# Patient Record
Sex: FEMALE | Race: WHITE | NOT HISPANIC OR LATINO | Employment: UNEMPLOYED | URBAN - METROPOLITAN AREA
[De-identification: names, ages, dates, MRNs, and addresses within clinical notes are randomized per-mention and may not be internally consistent; named-entity substitution may affect disease eponyms.]

---

## 2017-01-09 NOTE — TELEPHONE ENCOUNTER
Medication Refill Request    Date of phone call: 1/9/17    Medication being requested: Hydro-apap 7.5 sig: q6hrs  Qty: 120    Date of last visit: 12/14/16    Date of last refill: 12/14/16    BENSON up to date?: 12/13/16    Next Follow up?: 1/18/17    Any new pertinent information? (i.e, new medication allergies, new use of medications, change in patient's health or condition, non-compliance or inconsistency with prescribing agreement?): Patient states her knee replacement was rescheduled due to her being sick and she states she will run out of her pain medication on 1/14/17 which is a Saturday and we are closed.

## 2017-01-10 RX ORDER — HYDROCODONE BITARTRATE AND ACETAMINOPHEN 7.5; 325 MG/1; MG/1
1 TABLET ORAL EVERY 6 HOURS PRN
Qty: 24 TABLET | Refills: 0 | Status: SHIPPED | OUTPATIENT
Start: 2017-01-10 | End: 2017-01-18 | Stop reason: SDUPTHER

## 2017-01-18 ENCOUNTER — OFFICE VISIT (OUTPATIENT)
Dept: PAIN MEDICINE | Facility: CLINIC | Age: 57
End: 2017-01-18

## 2017-01-18 VITALS
WEIGHT: 206.6 LBS | OXYGEN SATURATION: 100 % | TEMPERATURE: 97.8 F | SYSTOLIC BLOOD PRESSURE: 152 MMHG | RESPIRATION RATE: 16 BRPM | DIASTOLIC BLOOD PRESSURE: 100 MMHG | BODY MASS INDEX: 33.2 KG/M2 | HEIGHT: 66 IN | HEART RATE: 102 BPM

## 2017-01-18 DIAGNOSIS — Z79.899 ENCOUNTER FOR LONG-TERM CURRENT USE OF HIGH RISK MEDICATION: ICD-10-CM

## 2017-01-18 DIAGNOSIS — M17.0 PRIMARY OSTEOARTHRITIS OF BOTH KNEES: ICD-10-CM

## 2017-01-18 DIAGNOSIS — M25.561 CHRONIC PAIN OF BOTH KNEES: ICD-10-CM

## 2017-01-18 DIAGNOSIS — G89.29 OTHER CHRONIC PAIN: Primary | ICD-10-CM

## 2017-01-18 DIAGNOSIS — G89.29 CHRONIC PAIN OF BOTH KNEES: ICD-10-CM

## 2017-01-18 DIAGNOSIS — M25.562 CHRONIC PAIN OF BOTH KNEES: ICD-10-CM

## 2017-01-18 LAB
POC AMPHETAMINES: NEGATIVE
POC BARBITURATES: NEGATIVE
POC BENZODIAZEPHINES: NEGATIVE
POC COCAINE: NEGATIVE
POC METHADONE: NEGATIVE
POC METHAMPHETAMINE SCREEN URINE: NEGATIVE
POC OPIATES: POSITIVE
POC OXYCODONE: NEGATIVE
POC PHENCYCLIDINE: NEGATIVE
POC PROPOXYPHENE: NEGATIVE
POC THC: NEGATIVE
POC TRICYCLIC ANTIDEPRESSANTS: POSITIVE

## 2017-01-18 PROCEDURE — 99213 OFFICE O/P EST LOW 20 MIN: CPT | Performed by: NURSE PRACTITIONER

## 2017-01-18 PROCEDURE — 80305 DRUG TEST PRSMV DIR OPT OBS: CPT | Performed by: NURSE PRACTITIONER

## 2017-01-18 RX ORDER — HYDROCODONE BITARTRATE AND ACETAMINOPHEN 7.5; 325 MG/1; MG/1
1 TABLET ORAL EVERY 6 HOURS PRN
Qty: 120 TABLET | Refills: 0 | Status: SHIPPED | OUTPATIENT
Start: 2017-01-18 | End: 2017-03-21 | Stop reason: SDUPTHER

## 2017-01-18 RX ORDER — CEFDINIR 300 MG/1
CAPSULE ORAL
Refills: 0 | COMMUNITY
Start: 2017-01-09 | End: 2017-03-21

## 2017-01-18 RX ORDER — PREDNISONE 10 MG/1
TABLET ORAL
Refills: 0 | COMMUNITY
Start: 2017-01-09 | End: 2017-03-21

## 2017-01-18 NOTE — PROGRESS NOTES
CHIEF COMPLAINT    Since pt's last visit, her right knee pain has increased a little. She said last week it felt a little better while she was on prednisone for her respiratory infection. Knee surgery rescheduled due to illness.     Subjective   Amanda Bettencourt is a 56 y.o. female  who presents to the office for follow-up.She has a history of chronic bilateral knee pain.  Overall her pain pattern remains unchanged.  She needs a knee replacement which was initially scheduled last week but had to be rescheduled due to illness.  She continues to report moderate ongoing benefit with current regimen.  She is maintained on hydrocodone 7.5/325 4/day.  Denies adverse reactions, ADL's by self.      Knee Pain    Incident onset: chronic problem. The pain is present in the left knee and right knee (TKA left knee 2005 and right knee 2007, left knee cap [id number].  ). The quality of the pain is described as aching. The pain is at a severity of 7/10. The pain has been constant since onset. Pertinent negatives include no numbness. The symptoms are aggravated by movement (walking, prolonged sitting). She has tried ice (Maintained on hydrocodone 7.5/325 4/day, Gabapentin 600 mg TID) for the symptoms. The treatment provided moderate relief.      PEG Assessment   What number best describes your pain on average in the past week?7  What number best describes how, during the past week, pain has interfered with your enjoyment of life?7  What number best describes how, during the past week, pain has interfered with your general activity?  7    The following portions of the patient's history were reviewed and updated as appropriate: allergies, current medications, past family history, past medical history, past social history, past surgical history and problem list.    Review of Systems   Constitutional: Negative for activity change, appetite change, chills and fever.   HENT: Positive for congestion.    Eyes: Negative for pain.  "  Respiratory: Positive for cough. Negative for shortness of breath and wheezing.    Cardiovascular: Negative for chest pain.   Gastrointestinal: Negative for constipation, diarrhea, nausea and vomiting.   Genitourinary: Negative for difficulty urinating, dyspareunia and dysuria.   Musculoskeletal: Negative for back pain.        Knee     Neurological: Negative for dizziness, weakness, light-headedness, numbness and headaches.   Psychiatric/Behavioral: Positive for sleep disturbance. Negative for agitation, confusion, hallucinations, self-injury and suicidal ideas. The patient is nervous/anxious.        Vitals:    01/18/17 0757   BP: 152/100   Pulse: 102   Resp: 16   Temp: 97.8 °F (36.6 °C)   SpO2: 100%   Weight: 206 lb 9.6 oz (93.7 kg)   Height: 66\" (167.6 cm)   PainSc: 7  Comment: right   PainLoc: Knee         Objective   Physical Exam   Constitutional: She is oriented to person, place, and time. She appears well-developed and well-nourished. She is cooperative.   HENT:   Head: Normocephalic and atraumatic.   Nose: Nose normal.   Eyes: Conjunctivae and lids are normal.   Neck: Trachea normal.   Cardiovascular: Normal rate, regular rhythm and normal heart sounds.    Pulmonary/Chest: Effort normal and breath sounds normal. No respiratory distress. She has no wheezes.   Musculoskeletal:        Right knee: She exhibits decreased range of motion and swelling. She exhibits no effusion and no ecchymosis. Tenderness found.   Neurological: She is alert and oriented to person, place, and time. Gait normal.   Skin: Skin is warm, dry and intact.   Psychiatric: She has a normal mood and affect. Her speech is normal and behavior is normal. Judgment and thought content normal. Cognition and memory are normal.   Nursing note and vitals reviewed.      Assessment/Plan   Amanda was seen today for pain.    Diagnoses and all orders for this visit:    Other chronic pain    Chronic pain of both knees    Primary osteoarthritis of both " knees    Encounter for long-term current use of high risk medication    Other orders  -     HYDROcodone-acetaminophen (NORCO) 7.5-325 MG per tablet; Take 1 tablet by mouth Every 6 (Six) Hours As Needed (pain).      --- Refill Hydrocodone. Patient appears stable with current regimen. No adverse effects. Regarding continuation of opioids, there is no evidence of aberrant behavior or any red flags.  The patient continues with appropriate response to opioid therapy. ADL's remain intact by self.   --- Routine UDS in office today as part of monitoring requirements for controlled substances.  The specimen was viewed and the immunoassay result reviewed and is +OPI, TCA.  This specimen will be sent to [facility] laboratory for confirmation.     --- Follow-up 1 month          BENSON REPORT    As part of the patient's treatment plan, I am prescribing controlled substances. The patient has been made aware of appropriate use of such medications, including potential risk of somnolence, limited ability to drive and/or work safely, and the potential for dependence or overdose. It has also bee made clear that these medications are for use by this patient only, without concomitant use of alcohol or other substances unless prescribed.     Patient has completed prescribing agreement detailing terms of continued prescribing of controlled substances, including monitoring BENSON reports, urine drug screening, and pill counts if necessary. The patient is aware that inappropriate use will results in cessation of prescribing such medications.    BENSON report has been reviewed and scanned into the patient's chart.    Date of last BENSON : 1-    History and physical exam exhibit continued safe and appropriate use of controlled substances.    EMR Dragon/Transcription disclaimer:   Much of this encounter note is an electronic transcription/translation of spoken language to printed text. The electronic translation of spoken language may  permit erroneous, or at times, nonsensical words or phrases to be inadvertently transcribed; Although I have reviewed the note for such errors, some may still exist.

## 2017-02-17 ENCOUNTER — TELEPHONE (OUTPATIENT)
Dept: PAIN MEDICINE | Facility: CLINIC | Age: 57
End: 2017-02-17

## 2017-03-21 ENCOUNTER — OFFICE VISIT (OUTPATIENT)
Dept: PAIN MEDICINE | Facility: CLINIC | Age: 57
End: 2017-03-21

## 2017-03-21 VITALS
DIASTOLIC BLOOD PRESSURE: 99 MMHG | SYSTOLIC BLOOD PRESSURE: 147 MMHG | TEMPERATURE: 96.1 F | HEIGHT: 66 IN | HEART RATE: 93 BPM | WEIGHT: 205.4 LBS | BODY MASS INDEX: 33.01 KG/M2 | OXYGEN SATURATION: 100 % | RESPIRATION RATE: 18 BRPM

## 2017-03-21 DIAGNOSIS — Z79.899 ENCOUNTER FOR LONG-TERM CURRENT USE OF HIGH RISK MEDICATION: ICD-10-CM

## 2017-03-21 DIAGNOSIS — G89.29 OTHER CHRONIC PAIN: Primary | ICD-10-CM

## 2017-03-21 DIAGNOSIS — M17.0 PRIMARY OSTEOARTHRITIS OF BOTH KNEES: ICD-10-CM

## 2017-03-21 DIAGNOSIS — G89.18 ACUTE POST-OPERATIVE PAIN: ICD-10-CM

## 2017-03-21 PROCEDURE — 99214 OFFICE O/P EST MOD 30 MIN: CPT | Performed by: NURSE PRACTITIONER

## 2017-03-21 RX ORDER — HYDROCODONE BITARTRATE AND ACETAMINOPHEN 7.5; 325 MG/1; MG/1
1 TABLET ORAL EVERY 8 HOURS PRN
Qty: 90 TABLET | Refills: 0 | Status: SHIPPED | OUTPATIENT
Start: 2017-03-21 | End: 2017-04-07 | Stop reason: SDUPTHER

## 2017-03-21 RX ORDER — POTASSIUM CHLORIDE 1500 MG/1
TABLET, FILM COATED, EXTENDED RELEASE ORAL
Refills: 5 | COMMUNITY
Start: 2017-02-13 | End: 2019-02-26

## 2017-03-21 RX ORDER — HYDROCODONE BITARTRATE AND ACETAMINOPHEN 7.5; 325 MG/1; MG/1
1 TABLET ORAL EVERY 6 HOURS PRN
Qty: 120 TABLET | Refills: 0 | Status: SHIPPED | OUTPATIENT
Start: 2017-03-21 | End: 2017-03-21 | Stop reason: SDUPTHER

## 2017-03-21 NOTE — PROGRESS NOTES
CHIEF COMPLAINT  Follow-up for knee pain. Ms. Bettencourt states that her knee pain has gotten better since her last appt. She states that she had 2 surgeries on her right knee since her last appt.    Subjective   Amanda Bettencourt is a 56 y.o. female  who presents to the office for follow-up.She has a history of chronic knee pain.  Knee pain has improved since previous office visit.  Since last office visit she has had a right total knee replacement 1- a second surgery because of hematoma on 2-.  She will be starting second round of PT soon.  Pain has improved since surgery.    She continues to report moderate benefit with current regimen. She is maintained on hydrocodone 7.5/325, she has been able to reduce the amount needed since surgery and would like to try going back down to 3/day. Denies adverse reactions, ADL's by self.     Knee Pain    Incident onset: chronic problem. The pain is present in the left knee and right knee (TKA left knee 2005 and right knee 2007, left knee cap [id number].  ). The quality of the pain is described as aching. The pain is at a severity of 6/10. The pain has been constant since onset. Associated symptoms include numbness (right lower leg). The symptoms are aggravated by movement (walking, prolonged sitting). She has tried ice (Maintained on hydrocodone 7.5/325 4/day, Gabapentin 600 mg TID) for the symptoms. The treatment provided moderate relief.      PEG Assessment   What number best describes your pain on average in the past week?6  What number best describes how, during the past week, pain has interfered with your enjoyment of life?7  What number best describes how, during the past week, pain has interfered with your general activity?  6    The following portions of the patient's history were reviewed and updated as appropriate: allergies, current medications, past family history, past medical history, past social history, past surgical history and problem  "list.    Review of Systems   Constitutional: Positive for fatigue.   HENT: Negative for congestion.    Eyes: Negative for visual disturbance.   Respiratory: Positive for cough. Negative for shortness of breath and wheezing.    Cardiovascular: Negative.    Gastrointestinal: Negative for constipation and diarrhea.   Genitourinary: Negative for difficulty urinating.   Musculoskeletal: Positive for arthralgias (right knee).   Neurological: Positive for numbness (right lower leg). Negative for weakness.   Psychiatric/Behavioral: Positive for sleep disturbance. Negative for suicidal ideas. The patient is nervous/anxious.        Vitals:    03/21/17 1316   BP: 147/99   Pulse: 93   Resp: 18   Temp: 96.1 °F (35.6 °C)   SpO2: 100%   Weight: 205 lb 6.4 oz (93.2 kg)   Height: 66\" (167.6 cm)   PainSc:   6   PainLoc: Knee     Objective   Physical Exam   Constitutional: She is oriented to person, place, and time. She appears well-developed and well-nourished. She is cooperative.   HENT:   Head: Normocephalic and atraumatic.   Nose: Nose normal.   Eyes: Conjunctivae and lids are normal.   Neck: Trachea normal.   Cardiovascular: Normal rate, regular rhythm and normal heart sounds.    Pulmonary/Chest: Effort normal and breath sounds normal. No respiratory distress. She has no wheezes.   Musculoskeletal:        Right knee: She exhibits decreased range of motion and swelling. She exhibits no effusion and no ecchymosis. Tenderness found.   Surgical incision mostly healed right knee, some minimal scattered scabbing noted. No erythema, drainage.     Neurological: She is alert and oriented to person, place, and time. Gait normal.   Skin: Skin is warm, dry and intact.   Psychiatric: She has a normal mood and affect. Her speech is normal and behavior is normal. Judgment and thought content normal. Cognition and memory are normal.   Nursing note and vitals reviewed.    Assessment/Plan   Amanda was seen today for knee pain.    Diagnoses and all " orders for this visit:    Other chronic pain    Primary osteoarthritis of both knees    Encounter for long-term current use of high risk medication    Acute post-operative pain    Other orders  -     Discontinue: HYDROcodone-acetaminophen (NORCO) 7.5-325 MG per tablet; Take 1 tablet by mouth Every 6 (Six) Hours As Needed (pain).  -     HYDROcodone-acetaminophen (NORCO) 7.5-325 MG per tablet; Take 1 tablet by mouth Every 8 (Eight) Hours As Needed for Severe Pain (7-10).      --- Refill hydrocodone, decrease to 3/day.  Patient appears stable with current regimen. No adverse effects. Regarding continuation of opioids, there is no evidence of aberrant behavior or any red flags.  The patient continues with appropriate response to opioid therapy. ADL's remain intact by self.   --- The urine drug screen confirmation from 1- has been reviewed and the result is appropriate based on patient history and BENSON report  --- Follow-up 2 months          BENSON REPORT    As part of the patient's treatment plan, I am prescribing controlled substances. The patient has been made aware of appropriate use of such medications, including potential risk of somnolence, limited ability to drive and/or work safely, and the potential for dependence or overdose. It has also bee made clear that these medications are for use by this patient only, without concomitant use of alcohol or other substances unless prescribed.     Patient has completed prescribing agreement detailing terms of continued prescribing of controlled substances, including monitoring BENSON reports, urine drug screening, and pill counts if necessary. The patient is aware that inappropriate use will results in cessation of prescribing such medications.    BENSON report has been reviewed and scanned into the patient's chart.    Date of last BENSON : 3-    History and physical exam exhibit continued safe and appropriate use of controlled substances.

## 2017-03-24 ENCOUNTER — TELEPHONE (OUTPATIENT)
Dept: PAIN MEDICINE | Facility: CLINIC | Age: 57
End: 2017-03-24

## 2017-03-24 NOTE — TELEPHONE ENCOUNTER
Ms. Bettencourt called on 3/23/17 and left a message stating that she got her hydro/apap filled at [facility] on [address] instead of her normal pharmacy because they didn't have it.

## 2017-04-07 RX ORDER — HYDROCODONE BITARTRATE AND ACETAMINOPHEN 7.5; 325 MG/1; MG/1
1 TABLET ORAL EVERY 8 HOURS PRN
Qty: 90 TABLET | Refills: 0 | Status: SHIPPED | OUTPATIENT
Start: 2017-04-07 | End: 2017-05-17 | Stop reason: SDUPTHER

## 2017-04-07 NOTE — TELEPHONE ENCOUNTER
Medication Refill Request    Date of phone call: 4/7/17    Medication being requested: Hydrocodone-acetaminophen (Norco) 7.5-325 sig: Take 1 Tablet by mouth Every 8 (Eight) Hours As Needed for Severe pain (7-10)  Qty: 90    Date of last visit: 3/21/17    Date of last refill: 3/21/17    BENSON up to date?: yes    Next Follow up?: 5/18/17    Any new pertinent information? (i.e, new medication allergies, new use of medications, change in patient's health or condition, non-compliance or inconsistency with prescribing agreement?): n/a

## 2017-05-17 ENCOUNTER — OFFICE VISIT (OUTPATIENT)
Dept: PAIN MEDICINE | Facility: CLINIC | Age: 57
End: 2017-05-17

## 2017-05-17 VITALS
OXYGEN SATURATION: 98 % | RESPIRATION RATE: 18 BRPM | HEIGHT: 66 IN | TEMPERATURE: 98.1 F | DIASTOLIC BLOOD PRESSURE: 95 MMHG | HEART RATE: 89 BPM | WEIGHT: 214 LBS | BODY MASS INDEX: 34.39 KG/M2 | SYSTOLIC BLOOD PRESSURE: 152 MMHG

## 2017-05-17 DIAGNOSIS — Z79.899 ENCOUNTER FOR LONG-TERM CURRENT USE OF HIGH RISK MEDICATION: ICD-10-CM

## 2017-05-17 DIAGNOSIS — M17.0 PRIMARY OSTEOARTHRITIS OF BOTH KNEES: ICD-10-CM

## 2017-05-17 DIAGNOSIS — G89.29 OTHER CHRONIC PAIN: Primary | ICD-10-CM

## 2017-05-17 PROCEDURE — 99213 OFFICE O/P EST LOW 20 MIN: CPT | Performed by: NURSE PRACTITIONER

## 2017-05-17 RX ORDER — HYDROCODONE BITARTRATE AND ACETAMINOPHEN 7.5; 325 MG/1; MG/1
1 TABLET ORAL EVERY 8 HOURS PRN
Qty: 90 TABLET | Refills: 0 | Status: SHIPPED | OUTPATIENT
Start: 2017-05-17 | End: 2017-06-09 | Stop reason: SDUPTHER

## 2017-06-09 NOTE — TELEPHONE ENCOUNTER
Medication Refill Request    Date of phone call: 6/8/17    Medication being requested: Hydro-apap 7.5 sig: q8hrs  Qty: 90    Date of last visit: 5/17/17    Date of last refill: 5/17/17    BENSON up to date?: 5/16/17    Next Follow up?: 7/14/17    Any new pertinent information? (i.e, new medication allergies, new use of medications, change in patient's health or condition, non-compliance or inconsistency with prescribing agreement?): n/a

## 2017-06-13 RX ORDER — HYDROCODONE BITARTRATE AND ACETAMINOPHEN 7.5; 325 MG/1; MG/1
1 TABLET ORAL EVERY 8 HOURS PRN
Qty: 90 TABLET | Refills: 0 | Status: SHIPPED | OUTPATIENT
Start: 2017-06-13 | End: 2017-07-18 | Stop reason: SDUPTHER

## 2017-06-14 ENCOUNTER — TELEPHONE (OUTPATIENT)
Dept: PAIN MEDICINE | Facility: CLINIC | Age: 57
End: 2017-06-14

## 2017-06-14 NOTE — TELEPHONE ENCOUNTER
I spoke with Ms. Bettencourt today and she wanted to let you know that she is having surgery on her ankle on 6/30/17 with Dr. Swanson. She also wanted to let you know that she is going to get her post-op pain med from her surgeon.

## 2017-07-18 ENCOUNTER — OFFICE VISIT (OUTPATIENT)
Dept: PAIN MEDICINE | Facility: CLINIC | Age: 57
End: 2017-07-18

## 2017-07-18 VITALS
BODY MASS INDEX: 34.39 KG/M2 | WEIGHT: 214 LBS | RESPIRATION RATE: 16 BRPM | DIASTOLIC BLOOD PRESSURE: 84 MMHG | HEIGHT: 66 IN | HEART RATE: 99 BPM | SYSTOLIC BLOOD PRESSURE: 124 MMHG | TEMPERATURE: 98.6 F | OXYGEN SATURATION: 98 %

## 2017-07-18 DIAGNOSIS — M25.561 CHRONIC PAIN OF BOTH KNEES: ICD-10-CM

## 2017-07-18 DIAGNOSIS — Z79.899 ENCOUNTER FOR LONG-TERM CURRENT USE OF HIGH RISK MEDICATION: ICD-10-CM

## 2017-07-18 DIAGNOSIS — G89.29 CHRONIC PAIN OF BOTH KNEES: ICD-10-CM

## 2017-07-18 DIAGNOSIS — M25.562 CHRONIC PAIN OF BOTH KNEES: ICD-10-CM

## 2017-07-18 DIAGNOSIS — G89.29 OTHER CHRONIC PAIN: Primary | ICD-10-CM

## 2017-07-18 DIAGNOSIS — M17.0 PRIMARY OSTEOARTHRITIS OF BOTH KNEES: ICD-10-CM

## 2017-07-18 LAB
POC AMPHETAMINES: NEGATIVE
POC BARBITURATES: NEGATIVE
POC BENZODIAZEPHINES: POSITIVE
POC COCAINE: NEGATIVE
POC METHADONE: NEGATIVE
POC METHAMPHETAMINE SCREEN URINE: NEGATIVE
POC OPIATES: POSITIVE
POC OXYCODONE: NEGATIVE
POC PHENCYCLIDINE: NEGATIVE
POC PROPOXYPHENE: NEGATIVE
POC THC: NEGATIVE
POC TRICYCLIC ANTIDEPRESSANTS: NEGATIVE

## 2017-07-18 PROCEDURE — 99213 OFFICE O/P EST LOW 20 MIN: CPT | Performed by: NURSE PRACTITIONER

## 2017-07-18 PROCEDURE — 80305 DRUG TEST PRSMV DIR OPT OBS: CPT | Performed by: NURSE PRACTITIONER

## 2017-07-18 RX ORDER — HYDROCODONE BITARTRATE AND ACETAMINOPHEN 7.5; 325 MG/1; MG/1
1 TABLET ORAL EVERY 8 HOURS PRN
Qty: 90 TABLET | Refills: 0 | Status: SHIPPED | OUTPATIENT
Start: 2017-07-18 | End: 2017-08-08 | Stop reason: SDUPTHER

## 2017-07-18 NOTE — PROGRESS NOTES
CHIEF COMPLAINT    Since last visit, pt states her right knee pain has decreased a little. Her left ankle pain has increased since she had surgery. She will have her stitches out on the 27th.    Subjective   Amanda Bettencourt is a 57 y.o. female  who presents to the office for follow-up.She has a history of chronic bilateral knee pain, overall knee pain stable, she is improved since right TKA approximately 5 months ago.  She underwent a left ankle surgery 6/30/2017, fractured the ankle a few months back, still reporting post operative discomfort following this procedure, she is in a cast today and is non-weightbearing.      She continues with hydrocodone 7.5/325, has been back to taking every 8 hours over the past week and is doing well with this regimen.  Denies adverse reactions, ADL's by self.      Knee Pain    Incident onset: chronic problem. The pain is present in the left knee and right knee (TKA left knee 2005 and right knee 2007, left knee cap [id number], right TKA 2017). The quality of the pain is described as aching. The pain is at a severity of 4/10. The pain has been constant since onset. Pertinent negatives include no numbness. The symptoms are aggravated by movement (walking, prolonged sitting). She has tried ice (Maintained on hydrocodone 7.5/325 4/day, Gabapentin 600 mg TID) for the symptoms. The treatment provided moderate relief.      PEG Assessment   What number best describes your pain on average in the past week?6  What number best describes how, during the past week, pain has interfered with your enjoyment of life?7  What number best describes how, during the past week, pain has interfered with your general activity?  7    The following portions of the patient's history were reviewed and updated as appropriate: allergies, current medications, past family history, past medical history, past social history, past surgical history and problem list.    Review of Systems   Constitutional:  "Negative for chills and fever.   Respiratory: Negative for shortness of breath.    Cardiovascular: Negative for chest pain.   Gastrointestinal: Negative for constipation, diarrhea, nausea and vomiting.   Genitourinary: Negative for difficulty urinating, dyspareunia and dysuria.   Musculoskeletal:        Right knee   Neurological: Negative for dizziness, weakness, light-headedness, numbness and headaches.   Psychiatric/Behavioral: Positive for sleep disturbance. Negative for confusion, hallucinations, self-injury and suicidal ideas. The patient is nervous/anxious.      Vitals:    07/18/17 1124   BP: 124/84   Pulse: 99   Resp: 16   Temp: 98.6 °F (37 °C)   SpO2: 98%   Weight: 214 lb (97.1 kg)  Comment: pt refused weigh in. wearing cast   Height: 66\" (167.6 cm)   PainSc:   4   PainLoc: Knee  Comment: left ankle 5-6       Objective   Physical Exam   Constitutional: She is oriented to person, place, and time. She appears well-developed and well-nourished. She is cooperative.   HENT:   Head: Normocephalic and atraumatic.   Nose: Nose normal.   Eyes: Conjunctivae and lids are normal.   Neck: Trachea normal.   Cardiovascular: Normal rate.    Pulmonary/Chest: Effort normal. No respiratory distress.   Musculoskeletal:        Right knee: She exhibits decreased range of motion and swelling. She exhibits no effusion and no ecchymosis. Tenderness found.        Left ankle: She exhibits decreased range of motion.   Surgical wound bilateral knee  Left foot/ankle cast    Neurological: She is alert and oriented to person, place, and time. Gait (knee scooter) abnormal.   Skin: Skin is warm, dry and intact.   Psychiatric: She has a normal mood and affect. Her speech is normal and behavior is normal. Judgment and thought content normal. Cognition and memory are normal.   Nursing note and vitals reviewed.      Assessment/Plan   Amanda was seen today for pain.    Diagnoses and all orders for this visit:    Other chronic pain  -     Urine " Drug Screen Confirmation  -     POC Urine Drug Screen, Triage    Primary osteoarthritis of both knees  -     Urine Drug Screen Confirmation  -     POC Urine Drug Screen, Triage    Chronic pain of both knees  -     Urine Drug Screen Confirmation  -     POC Urine Drug Screen, Triage    Encounter for long-term current use of high risk medication  -     Urine Drug Screen Confirmation  -     POC Urine Drug Screen, Triage    Other orders  -     HYDROcodone-acetaminophen (NORCO) 7.5-325 MG per tablet; Take 1 tablet by mouth Every 8 (Eight) Hours As Needed for Severe Pain .      --- Refill hydrocodone. Patient appears stable with current regimen. No adverse effects. Regarding continuation of opioids, there is no evidence of aberrant behavior or any red flags.  The patient continues with appropriate response to opioid therapy. ADL's remain intact by self.   --- Routine UDS in office today as part of monitoring requirements for controlled substances.  The specimen was viewed and the immunoassay result reviewed and is +OPI, BZD (assume BZD false positive).  This specimen will be sent to [facility] laboratory for confirmation.     --- Follow-up 2 months          BENSON REPORT  As part of the patient's treatment plan, I am prescribing controlled substances. The patient has been made aware of appropriate use of such medications, including potential risk of somnolence, limited ability to drive and/or work safely, and the potential for dependence or overdose. It has also bee made clear that these medications are for use by this patient only, without concomitant use of alcohol or other substances unless prescribed.     Patient has completed prescribing agreement detailing terms of continued prescribing of controlled substances, including monitoring BENSON reports, urine drug screening, and pill counts if necessary. The patient is aware that inappropriate use will results in cessation of prescribing such medications.    BENSON report  has been reviewed and scanned into the patient's chart.    Date of last BENSON : 7/17/2017    History and physical exam exhibit continued safe and appropriate use of controlled substances.    EMR Dragon/Transcription disclaimer:   Much of this encounter note is an electronic transcription/translation of spoken language to printed text. The electronic translation of spoken language may permit erroneous, or at times, nonsensical words or phrases to be inadvertently transcribed; Although I have reviewed the note for such errors, some may still exist.

## 2017-08-08 RX ORDER — HYDROCODONE BITARTRATE AND ACETAMINOPHEN 7.5; 325 MG/1; MG/1
1 TABLET ORAL EVERY 8 HOURS PRN
Qty: 90 TABLET | Refills: 0 | Status: SHIPPED | OUTPATIENT
Start: 2017-08-08 | End: 2017-09-13 | Stop reason: SDUPTHER

## 2017-08-08 NOTE — TELEPHONE ENCOUNTER
Medication Refill Request    Date of phone call: 8/8/17    Medication being requested: Hydrocodone-apap 7.5 sig: q8hrs  Qty: 90    Date of last visit: 7/18/17    Date of last refill: 7/18/17    BENSON up to date?: 7/17/17    Next Follow up?: 9/19/17    Any new pertinent information? (i.e, new medication allergies, new use of medications, change in patient's health or condition, non-compliance or inconsistency with prescribing agreement?): n/a

## 2017-09-06 ENCOUNTER — CLINICAL SUPPORT (OUTPATIENT)
Dept: OTHER | Facility: HOSPITAL | Age: 57
End: 2017-09-06

## 2017-09-06 ENCOUNTER — HOSPITAL ENCOUNTER (OUTPATIENT)
Dept: PET IMAGING | Facility: HOSPITAL | Age: 57
Discharge: HOME | End: 2017-09-06
Admitting: CLINICAL NURSE SPECIALIST

## 2017-09-06 DIAGNOSIS — F17.210 SMOKING GREATER THAN 30 PACK YEARS: ICD-10-CM

## 2017-09-06 DIAGNOSIS — Z12.2 ENCOUNTER FOR SCREENING FOR LUNG CANCER: ICD-10-CM

## 2017-09-06 DIAGNOSIS — Z12.2 ENCOUNTER FOR SCREENING FOR LUNG CANCER: Primary | ICD-10-CM

## 2017-09-06 PROBLEM — E11.9 DIABETES MELLITUS (HCC): Status: ACTIVE | Noted: 2017-09-06

## 2017-09-06 PROBLEM — I10 HYPERTENSION: Status: ACTIVE | Noted: 2017-09-06

## 2017-09-06 PROBLEM — E78.5 DYSLIPIDEMIA: Status: ACTIVE | Noted: 2017-09-06

## 2017-09-06 PROCEDURE — G0297 LDCT FOR LUNG CA SCREEN: HCPCS

## 2017-09-06 PROCEDURE — G0296 VISIT TO DETERM LDCT ELIG: HCPCS | Performed by: CLINICAL NURSE SPECIALIST

## 2017-09-13 ENCOUNTER — OFFICE VISIT (OUTPATIENT)
Dept: PAIN MEDICINE | Facility: CLINIC | Age: 57
End: 2017-09-13

## 2017-09-13 VITALS
TEMPERATURE: 99 F | HEART RATE: 100 BPM | SYSTOLIC BLOOD PRESSURE: 165 MMHG | HEIGHT: 66 IN | OXYGEN SATURATION: 99 % | DIASTOLIC BLOOD PRESSURE: 96 MMHG | RESPIRATION RATE: 18 BRPM

## 2017-09-13 DIAGNOSIS — M17.0 PRIMARY OSTEOARTHRITIS OF BOTH KNEES: ICD-10-CM

## 2017-09-13 DIAGNOSIS — Z79.899 ENCOUNTER FOR LONG-TERM CURRENT USE OF HIGH RISK MEDICATION: ICD-10-CM

## 2017-09-13 DIAGNOSIS — G89.29 OTHER CHRONIC PAIN: Primary | ICD-10-CM

## 2017-09-13 PROCEDURE — 99214 OFFICE O/P EST MOD 30 MIN: CPT | Performed by: NURSE PRACTITIONER

## 2017-09-13 RX ORDER — GABAPENTIN 800 MG/1
800 TABLET ORAL 3 TIMES DAILY
Qty: 90 TABLET | Refills: 1 | Status: SHIPPED | OUTPATIENT
Start: 2017-09-13 | End: 2017-11-10 | Stop reason: SDUPTHER

## 2017-09-13 RX ORDER — HYDROCODONE BITARTRATE AND ACETAMINOPHEN 7.5; 325 MG/1; MG/1
1 TABLET ORAL EVERY 8 HOURS PRN
Qty: 90 TABLET | Refills: 0 | Status: SHIPPED | OUTPATIENT
Start: 2017-09-13 | End: 2017-09-13 | Stop reason: SDUPTHER

## 2017-09-13 RX ORDER — HYDROCODONE BITARTRATE AND ACETAMINOPHEN 7.5; 325 MG/1; MG/1
1 TABLET ORAL EVERY 8 HOURS PRN
Qty: 90 TABLET | Refills: 0 | Status: SHIPPED | OUTPATIENT
Start: 2017-09-13 | End: 2017-10-05 | Stop reason: SDUPTHER

## 2017-09-13 NOTE — PROGRESS NOTES
"CHIEF COMPLAINT  Follow-up for knee pain. Ms. Bettencourt states that her knee pain is unchanged from her last appt.    Subjective   Amanda Bettencourt is a 57 y.o. female  who presents to the office for follow-up.She has a history of chronic knee pain and unchanged since last office visit.    Is in a walking boot on her LLE. Is having increased \"nerve pain.\"    Complains of pain in her knees. Today her pain is 6/10VAS. Pain in continuous and unchanged. Continues with Hydrocodone 7.5/325 3/day and gabapentin 600 mg 3/day.  Denies any side effects from the regimen. The regimen helps decrease her pain by 60-70%. ADL's by self.    Knee Pain    Incident onset: chronic problem. The pain is present in the left knee and right knee (TKA left knee 2005 and right knee 2007, left knee cap [id number], right TKA 2017). The quality of the pain is described as aching. The pain is at a severity of 6/10. The pain has been constant since onset. Associated symptoms include numbness (left foot). The symptoms are aggravated by movement (walking, prolonged sitting). She has tried ice (Maintained on hydrocodone 7.5/325 3/day, Gabapentin 600 mg TID) for the symptoms. The treatment provided moderate relief.      PEG Assessment   What number best describes your pain on average in the past week?6  What number best describes how, during the past week, pain has interfered with your enjoyment of life?5  What number best describes how, during the past week, pain has interfered with your general activity?  6    The following portions of the patient's history were reviewed and updated as appropriate: allergies, current medications, past family history, past medical history, past social history, past surgical history and problem list.    Review of Systems   Constitutional: Negative for fatigue.   HENT: Negative for congestion.    Eyes: Negative for visual disturbance.   Respiratory: Positive for cough. Negative for shortness of breath and " "wheezing.    Cardiovascular: Negative.    Gastrointestinal: Negative for constipation and diarrhea.   Genitourinary: Negative for difficulty urinating.   Musculoskeletal: Positive for arthralgias (right knee and left ankle).   Neurological: Positive for numbness (left foot). Negative for weakness.   Psychiatric/Behavioral: Positive for sleep disturbance. Negative for suicidal ideas. The patient is nervous/anxious.        Vitals:    09/13/17 0939   BP: 165/96   Pulse: 100   Resp: 18   Temp: 99 °F (37.2 °C)   SpO2: 99%   Weight: Comment: pt refused to give weight. wearing a walking boot   Height: 66\" (167.6 cm)   PainSc:   6   PainLoc: Knee     Objective   Physical Exam   Constitutional: She is oriented to person, place, and time. She appears well-developed and well-nourished. She is cooperative.   HENT:   Head: Normocephalic and atraumatic.   Nose: Nose normal.   Eyes: Conjunctivae and lids are normal.   Neck: Trachea normal.   Cardiovascular: Normal rate.    Pulmonary/Chest: Effort normal. No respiratory distress.   Musculoskeletal:        Right knee: She exhibits decreased range of motion and swelling. She exhibits no effusion and no ecchymosis. Tenderness found.        Left ankle: She exhibits decreased range of motion.   Left foot/ankle walking boot   Neurological: She is alert and oriented to person, place, and time. Gait (altered by LLE boot) abnormal.   Skin: Skin is warm, dry and intact.   Psychiatric: She has a normal mood and affect. Her speech is normal and behavior is normal. Judgment and thought content normal. Cognition and memory are normal.   Nursing note and vitals reviewed.      Assessment/Plan   Amanda was seen today for knee pain.    Diagnoses and all orders for this visit:    Other chronic pain    Primary osteoarthritis of both knees    Encounter for long-term current use of high risk medication    Other orders  -     Discontinue: HYDROcodone-acetaminophen (NORCO) 7.5-325 MG per tablet; Take 1 " tablet by mouth Every 8 (Eight) Hours As Needed for Severe Pain .  -     HYDROcodone-acetaminophen (NORCO) 7.5-325 MG per tablet; Take 1 tablet by mouth Every 8 (Eight) Hours As Needed for Severe Pain .  -     gabapentin (NEURONTIN) 800 MG tablet; Take 1 tablet by mouth 3 (Three) Times a Day.      --- The urine drug screen confirmation from 7-18-17 has been reviewed and the result is appropriate based on patient history and BENSON report  --- Refill Hydrocodone. Patient appears stable with current regimen. No adverse effects. Regarding continuation of opioids, there is no evidence of aberrant behavior or any red flags.  The patient continues with appropriate response to opioid therapy. ADL's remain intact by self.   --- Increase gabapentin to 800 mg TID. Discussed medication with the patient.  Included in this discussion was the potential for side effects and adverse events.  Patient verbalized understanding and wished to proceed.  Prescription will be sent to pharmacy.  --- Follow-up 2 months or sooner if needed.       BENSON REPORT    As part of the patient's treatment plan, I am prescribing controlled substances. The patient has been made aware of appropriate use of such medications, including potential risk of somnolence, limited ability to drive and/or work safely, and the potential for dependence or overdose. It has also bee made clear that these medications are for use by this patient only, without concomitant use of alcohol or other substances unless prescribed.     Patient has completed prescribing agreement detailing terms of continued prescribing of controlled substances, including monitoring BENSON reports, urine drug screening, and pill counts if necessary. The patient is aware that inappropriate use will results in cessation of prescribing such medications.    BENSON report has been reviewed and scanned into the patient's chart.    Date of last BENSON : 9-11-17    History and physical exam exhibit  continued safe and appropriate use of controlled substances.      EMR Dragon/Transcription disclaimer:   Much of this encounter note is an electronic transcription/translation of spoken language to printed text. The electronic translation of spoken language may permit erroneous, or at times, nonsensical words or phrases to be inadvertently transcribed; Although I have reviewed the note for such errors, some may still exist.

## 2017-10-05 NOTE — TELEPHONE ENCOUNTER
Medication Refill Request    Date of phone call: 10/5/17    Medication being requested: Hydrocodone-apap 7.5 sig: q8hrs  Qty: 90    Date of last visit: 9/13/17    Date of last refill: 9/13/17    BENSON up to date?: 9/12/17    Next Follow up?: 11/13/17    Any new pertinent information? (i.e, new medication allergies, new use of medications, change in patient's health or condition, non-compliance or inconsistency with prescribing agreement?): n/a

## 2017-10-09 RX ORDER — HYDROCODONE BITARTRATE AND ACETAMINOPHEN 7.5; 325 MG/1; MG/1
1 TABLET ORAL EVERY 8 HOURS PRN
Qty: 90 TABLET | Refills: 0 | Status: SHIPPED | OUTPATIENT
Start: 2017-10-09 | End: 2017-11-10 | Stop reason: SDUPTHER

## 2017-11-10 ENCOUNTER — OFFICE VISIT (OUTPATIENT)
Dept: PAIN MEDICINE | Facility: CLINIC | Age: 57
End: 2017-11-10

## 2017-11-10 VITALS
OXYGEN SATURATION: 98 % | HEART RATE: 96 BPM | BODY MASS INDEX: 34.72 KG/M2 | RESPIRATION RATE: 16 BRPM | WEIGHT: 216 LBS | SYSTOLIC BLOOD PRESSURE: 132 MMHG | DIASTOLIC BLOOD PRESSURE: 86 MMHG | TEMPERATURE: 96.6 F | HEIGHT: 66 IN

## 2017-11-10 DIAGNOSIS — M17.0 PRIMARY OSTEOARTHRITIS OF BOTH KNEES: ICD-10-CM

## 2017-11-10 DIAGNOSIS — G89.29 OTHER CHRONIC PAIN: Primary | ICD-10-CM

## 2017-11-10 DIAGNOSIS — Z79.899 ENCOUNTER FOR LONG-TERM CURRENT USE OF HIGH RISK MEDICATION: ICD-10-CM

## 2017-11-10 DIAGNOSIS — M25.569 KNEE PAIN, UNSPECIFIED CHRONICITY, UNSPECIFIED LATERALITY: ICD-10-CM

## 2017-11-10 PROCEDURE — 99213 OFFICE O/P EST LOW 20 MIN: CPT | Performed by: NURSE PRACTITIONER

## 2017-11-10 RX ORDER — GABAPENTIN 800 MG/1
800 TABLET ORAL 3 TIMES DAILY
Qty: 90 TABLET | Refills: 1 | Status: SHIPPED | OUTPATIENT
Start: 2017-11-10 | End: 2017-12-18 | Stop reason: SDUPTHER

## 2017-11-10 RX ORDER — HYDROCODONE BITARTRATE AND ACETAMINOPHEN 7.5; 325 MG/1; MG/1
1 TABLET ORAL EVERY 8 HOURS PRN
Qty: 90 TABLET | Refills: 0 | Status: SHIPPED | OUTPATIENT
Start: 2017-11-10 | End: 2017-12-01 | Stop reason: SDUPTHER

## 2017-11-10 NOTE — PROGRESS NOTES
"CHIEF COMPLAINT    F/U knee and left ankle pain, last visit 9-13-17. Pt states the increase in Gabapentin has helped reduce the tingling and sharpness in her left ankle.     Subjective   Amanda Bettencourt is a 57 y.o. female  who presents to the office for follow-up.She has a history of chronic knee pain/OA. At her last office visit, gabapentin was increased to 800 mg TID due to \"nerve pain\" in LLE. Is noticing improvement in this. No longer has to wear boot of left foot.     Complains of pain in her knees. Today her pain is 5/10VAs. Describes the pain as continuous and mildly improved since last office visit. Continues with Hydrocodone 7.5/325 3/day and gabapentin 800 mg 3/day.  Denies any side effects from the regimen. The regimen helps decrease her pain by 60-70%. ADL's by self.    Sees Dr. Mahoney for her knees.  Sees Dr. kumar for her left ankle.     Knee Pain    Incident onset: chronic problem. The pain is present in the left knee and right knee (TKA left knee 2005 and right knee 2007, left knee cap [id number], right TKA 2017). The quality of the pain is described as aching. The pain is at a severity of 5/10. The pain has been constant (mildly improved since last office visit) since onset. Associated symptoms include numbness (left foot). The symptoms are aggravated by movement (walking, prolonged sitting). She has tried ice (Maintained on hydrocodone 7.5/325 3/day, Gabapentin 600 mg TID) for the symptoms. The treatment provided moderate relief.      PEG Assessment   What number best describes your pain on average in the past week?6  What number best describes how, during the past week, pain has interfered with your enjoyment of life?5  What number best describes how, during the past week, pain has interfered with your general activity?  6    The following portions of the patient's history were reviewed and updated as appropriate: allergies, current medications, past family history, past medical " "history, past social history, past surgical history and problem list.    Review of Systems   Constitutional: Negative for chills, fatigue and fever.   HENT: Negative for congestion.    Eyes: Negative for visual disturbance.   Respiratory: Positive for cough (pt states she had a lung screen done and it was clear). Negative for shortness of breath and wheezing.    Cardiovascular: Negative.  Negative for chest pain.   Gastrointestinal: Negative for constipation and diarrhea.   Genitourinary: Negative for difficulty urinating.   Musculoskeletal: Positive for arthralgias (right knee and left ankle).   Neurological: Positive for numbness (left foot) and headaches (sinus related , mainly in AM). Negative for dizziness, weakness and light-headedness.   Psychiatric/Behavioral: Positive for sleep disturbance. Negative for agitation, confusion, hallucinations and suicidal ideas. The patient is nervous/anxious.        Vitals:    11/10/17 0846   BP: 132/86   Pulse: 96   Resp: 16   Temp: 96.6 °F (35.9 °C)   SpO2: 98%   Weight: 216 lb (98 kg)   Height: 66\" (167.6 cm)   PainSc:   5   PainLoc: Knee     Objective   Physical Exam   Constitutional: She is oriented to person, place, and time. She appears well-developed and well-nourished. She is cooperative.   HENT:   Head: Normocephalic and atraumatic.   Nose: Nose normal.   Eyes: Conjunctivae and lids are normal.   Neck: Trachea normal.   Cardiovascular: Normal rate.    Pulmonary/Chest: Effort normal. No respiratory distress.   Musculoskeletal:        Right knee: She exhibits decreased range of motion and swelling. She exhibits no effusion and no ecchymosis. Tenderness found.        Left ankle: She exhibits decreased range of motion.   Neurological: She is alert and oriented to person, place, and time. Gait (altered by LLE ) abnormal.   Skin: Skin is warm, dry and intact.   Psychiatric: She has a normal mood and affect. Her speech is normal and behavior is normal. Judgment and thought " content normal. Cognition and memory are normal.   Nursing note and vitals reviewed.      Assessment/Plan   Amanda was seen today for pain.    Diagnoses and all orders for this visit:    Other chronic pain    Primary osteoarthritis of both knees    Knee pain, unspecified chronicity, unspecified laterality    Encounter for long-term current use of high risk medication    Other orders  -     HYDROcodone-acetaminophen (NORCO) 7.5-325 MG per tablet; Take 1 tablet by mouth Every 8 (Eight) Hours As Needed for Severe Pain .  -     gabapentin (NEURONTIN) 800 MG tablet; Take 1 tablet by mouth 3 (Three) Times a Day.      --- The urine drug screen confirmation from 7-18-17 has been reviewed and the result is appropriate based on patient history and BENSON report  --- refill Hydrocodone and gabapentin. Patient appears stable with current regimen. No adverse effects. Regarding continuation of opioids, there is no evidence of aberrant behavior or any red flags.  The patient continues with appropriate response to opioid therapy. ADL's remain intact by self.   --- Follow-up with Dr. kumar as scheduled.  --- Follow-up 2 months or sooner if needed.       BENSON REPORT    As part of the patient's treatment plan, I am prescribing controlled substances. The patient has been made aware of appropriate use of such medications, including potential risk of somnolence, limited ability to drive and/or work safely, and the potential for dependence or overdose. It has also bee made clear that these medications are for use by this patient only, without concomitant use of alcohol or other substances unless prescribed.     Patient has completed prescribing agreement detailing terms of continued prescribing of controlled substances, including monitoring BENSON reports, urine drug screening, and pill counts if necessary. The patient is aware that inappropriate use will results in cessation of prescribing such medications.    BENSON report has  been reviewed and scanned into the patient's chart.    Date of last BENSON : 11-7-17    History and physical exam exhibit continued safe and appropriate use of controlled substances.      EMR Dragon/Transcription disclaimer:   Much of this encounter note is an electronic transcription/translation of spoken language to printed text. The electronic translation of spoken language may permit erroneous, or at times, nonsensical words or phrases to be inadvertently transcribed; Although I have reviewed the note for such errors, some may still exist.

## 2017-12-04 RX ORDER — HYDROCODONE BITARTRATE AND ACETAMINOPHEN 7.5; 325 MG/1; MG/1
1 TABLET ORAL EVERY 8 HOURS PRN
Qty: 90 TABLET | Refills: 0 | Status: SHIPPED | OUTPATIENT
Start: 2017-12-04 | End: 2018-01-09 | Stop reason: SDUPTHER

## 2017-12-08 ENCOUNTER — TELEPHONE (OUTPATIENT)
Dept: PAIN MEDICINE | Facility: CLINIC | Age: 57
End: 2017-12-08

## 2017-12-08 NOTE — TELEPHONE ENCOUNTER
MsLisette Bettencourt called today to inform our office that she took her hydro/apap to [facility] in [place] to get a refill since her regular pharmacy didn't have the medication.

## 2017-12-18 ENCOUNTER — TELEPHONE (OUTPATIENT)
Dept: PAIN MEDICINE | Facility: CLINIC | Age: 57
End: 2017-12-18

## 2017-12-18 RX ORDER — GABAPENTIN 800 MG/1
800 TABLET ORAL 3 TIMES DAILY
Qty: 270 TABLET | Refills: 1 | Status: SHIPPED | OUTPATIENT
Start: 2017-12-18

## 2017-12-18 NOTE — TELEPHONE ENCOUNTER
Patient wanted to know if we could send a 90 day supply of Gabapentin to [facility]?    If so I will need a script printed

## 2018-01-09 ENCOUNTER — OFFICE VISIT (OUTPATIENT)
Dept: PAIN MEDICINE | Facility: CLINIC | Age: 58
End: 2018-01-09

## 2018-01-09 VITALS
HEART RATE: 108 BPM | HEIGHT: 66 IN | SYSTOLIC BLOOD PRESSURE: 149 MMHG | BODY MASS INDEX: 34.87 KG/M2 | TEMPERATURE: 98.4 F | OXYGEN SATURATION: 98 % | DIASTOLIC BLOOD PRESSURE: 96 MMHG | RESPIRATION RATE: 18 BRPM | WEIGHT: 217 LBS

## 2018-01-09 DIAGNOSIS — Z79.899 ENCOUNTER FOR LONG-TERM CURRENT USE OF HIGH RISK MEDICATION: ICD-10-CM

## 2018-01-09 DIAGNOSIS — M17.0 PRIMARY OSTEOARTHRITIS OF BOTH KNEES: ICD-10-CM

## 2018-01-09 DIAGNOSIS — G89.29 OTHER CHRONIC PAIN: Primary | ICD-10-CM

## 2018-01-09 DIAGNOSIS — M25.569 KNEE PAIN, UNSPECIFIED CHRONICITY, UNSPECIFIED LATERALITY: ICD-10-CM

## 2018-01-09 PROCEDURE — 99213 OFFICE O/P EST LOW 20 MIN: CPT | Performed by: NURSE PRACTITIONER

## 2018-01-09 RX ORDER — HYDROCODONE BITARTRATE AND ACETAMINOPHEN 7.5; 325 MG/1; MG/1
1 TABLET ORAL EVERY 8 HOURS PRN
Qty: 90 TABLET | Refills: 0 | Status: SHIPPED | OUTPATIENT
Start: 2018-01-09 | End: 2018-01-31 | Stop reason: SDUPTHER

## 2018-01-09 NOTE — PROGRESS NOTES
CHIEF COMPLAINT  Bilateral knee pain and Left Ankle pain are unchanged since last visit.    Subjective   Amanda Bettencourt is a 57 y.o. female  who presents to the office for follow-up.She has a history of chronic knee pain and left ankle pain. Reports her pain pattern is unchanged since last office visit.    She reports that she fell on right knee in early December. Has not seen Dr. Mahoney since then. No improvement with Voltaren gel.     Complains of pain in her knees. Today her pain is 6/10VAS. Describes the pain as nearly continuous and unchanged.  Continues with Hydrocodone 7.5/325 3/day and gabapentin 800 mg 3/day.  Denies any side effects from the regimen. The regimen helps decrease her pain by 50%. Notes improvement in function and activity with regimen. ADL's by self.     Sees Dr. Mahoney for her knees.  Sees Dr. kumar for her left ankle which is improved, but still having swelling.    Knee Pain    Incident onset: chronic problem. The pain is present in the left knee and right knee (TKA left knee 2005 and right knee 2007, left knee cap [id number], right TKA 2017). The quality of the pain is described as aching. The pain is at a severity of 6/10. The pain has been constant (unchanged since last office visit) since onset. Pertinent negatives include no numbness. The symptoms are aggravated by movement (walking, prolonged sitting). She has tried ice (Maintained on hydrocodone 7.5/325 3/day, Gabapentin 600 mg TID) for the symptoms. The treatment provided moderate relief.      PEG Assessment   What number best describes your pain on average in the past week?6  What number best describes how, during the past week, pain has interfered with your enjoyment of life?6  What number best describes how, during the past week, pain has interfered with your general activity?  6    The following portions of the patient's history were reviewed and updated as appropriate: allergies, current medications, past family  "history, past medical history, past social history, past surgical history and problem list.    Review of Systems   Constitutional: Negative for chills and fever.   Respiratory: Negative for shortness of breath.    Cardiovascular: Negative for chest pain.   Gastrointestinal: Negative for constipation, diarrhea, nausea and vomiting.   Genitourinary: Negative for difficulty urinating and dyspareunia.   Musculoskeletal:        Knee and ankle   Neurological: Negative for dizziness, weakness, light-headedness, numbness and headaches.   Psychiatric/Behavioral: Positive for sleep disturbance. Negative for confusion, hallucinations, self-injury and suicidal ideas. The patient is nervous/anxious.        Vitals:    01/09/18 0834   BP: 149/96   Pulse: 108   Resp: 18   Temp: 98.4 °F (36.9 °C)   SpO2: 98%   Weight: 98.4 kg (217 lb)   Height: 167.6 cm (66\")   PainSc:   6   PainLoc: Knee     Objective   Physical Exam   Constitutional: She is oriented to person, place, and time. She appears well-developed and well-nourished. She is cooperative.   HENT:   Head: Normocephalic and atraumatic.   Nose: Nose normal.   Eyes: Conjunctivae and lids are normal.   Neck: Trachea normal.   Cardiovascular: Normal rate.    Pulmonary/Chest: Effort normal. No respiratory distress.   Musculoskeletal:        Right knee: She exhibits decreased range of motion and swelling. She exhibits no effusion and no ecchymosis. Tenderness found.        Left ankle: She exhibits decreased range of motion.   Surgical scar of bilateral knees.   Neurological: She is alert and oriented to person, place, and time. Gait (altered by LLE ) abnormal.   Skin: Skin is warm, dry and intact.   Psychiatric: She has a normal mood and affect. Her speech is normal and behavior is normal. Judgment and thought content normal. Cognition and memory are normal.   Nursing note and vitals reviewed.    Assessment/Plan   Amanda was seen today for knee pain and ankle injury.    Diagnoses and " all orders for this visit:    Other chronic pain    Primary osteoarthritis of both knees    Knee pain, unspecified chronicity, unspecified laterality    Encounter for long-term current use of high risk medication    Other orders  -     HYDROcodone-acetaminophen (NORCO) 7.5-325 MG per tablet; Take 1 tablet by mouth Every 8 (Eight) Hours As Needed for Severe Pain .      --- The urine drug screen confirmation from 7-18-17 has been reviewed and the result is appropriate based on patient history and BENSON report  --- Refill Hydrocodone. Patient appears stable with current regimen. No adverse effects. Regarding continuation of opioids, there is no evidence of aberrant behavior or any red flags.  The patient continues with appropriate response to opioid therapy. ADL's remain intact by self.  --- Suggest follow-up with Dr. Mahoney.   --- Follow-up 2 months or sooner if needed.       BENSON REPORT    As part of the patient's treatment plan, I am prescribing controlled substances. The patient has been made aware of appropriate use of such medications, including potential risk of somnolence, limited ability to drive and/or work safely, and the potential for dependence or overdose. It has also bee made clear that these medications are for use by this patient only, without concomitant use of alcohol or other substances unless prescribed.     Patient has completed prescribing agreement detailing terms of continued prescribing of controlled substances, including monitoring BENSON reports, urine drug screening, and pill counts if necessary. The patient is aware that inappropriate use will results in cessation of prescribing such medications.    BENSON report has been reviewed and scanned into the patient's chart.    Date of last BENSON : 1-8-18    History and physical exam exhibit continued safe and appropriate use of controlled substances.      EMR Dragon/Transcription disclaimer:   Much of this encounter note is an electronic  transcription/translation of spoken language to printed text. The electronic translation of spoken language may permit erroneous, or at times, nonsensical words or phrases to be inadvertently transcribed; Although I have reviewed the note for such errors, some may still exist.

## 2018-01-31 RX ORDER — HYDROCODONE BITARTRATE AND ACETAMINOPHEN 7.5; 325 MG/1; MG/1
1 TABLET ORAL EVERY 8 HOURS PRN
Qty: 90 TABLET | Refills: 0 | Status: SHIPPED | OUTPATIENT
Start: 2018-01-31 | End: 2018-03-07 | Stop reason: SDUPTHER

## 2018-02-20 ENCOUNTER — OFFICE VISIT (OUTPATIENT)
Dept: GASTROENTEROLOGY | Facility: CLINIC | Age: 58
End: 2018-02-20

## 2018-02-20 VITALS
HEIGHT: 66 IN | BODY MASS INDEX: 35.58 KG/M2 | TEMPERATURE: 98.5 F | WEIGHT: 221.4 LBS | SYSTOLIC BLOOD PRESSURE: 136 MMHG | DIASTOLIC BLOOD PRESSURE: 86 MMHG

## 2018-02-20 DIAGNOSIS — K31.84 GASTROPARESIS: Primary | ICD-10-CM

## 2018-02-20 DIAGNOSIS — K21.9 GASTROESOPHAGEAL REFLUX DISEASE, ESOPHAGITIS PRESENCE NOT SPECIFIED: ICD-10-CM

## 2018-02-20 PROCEDURE — 99213 OFFICE O/P EST LOW 20 MIN: CPT | Performed by: NURSE PRACTITIONER

## 2018-02-20 RX ORDER — PANTOPRAZOLE SODIUM 40 MG/1
TABLET, DELAYED RELEASE ORAL
Qty: 90 TABLET | Refills: 3 | Status: SHIPPED | OUTPATIENT
Start: 2018-02-20 | End: 2019-02-18 | Stop reason: SDUPTHER

## 2018-02-20 RX ORDER — METOCLOPRAMIDE 10 MG/1
10 TABLET ORAL 3 TIMES DAILY
Qty: 270 TABLET | Refills: 3 | Status: SHIPPED | OUTPATIENT
Start: 2018-02-20 | End: 2019-05-02 | Stop reason: SDUPTHER

## 2018-02-20 RX ORDER — METOCLOPRAMIDE 10 MG/1
10 TABLET ORAL 3 TIMES DAILY
Qty: 90 TABLET | Refills: 0 | Status: SHIPPED | OUTPATIENT
Start: 2018-02-20 | End: 2018-02-20 | Stop reason: SDUPTHER

## 2018-02-20 NOTE — PROGRESS NOTES
Chief Complaint   Patient presents with   • Heartburn   • Diarrhea   • Abdominal Pain       Amanda Bettencourt is a  57 y.o. female here for a follow up visit for GERD and gastroparesis secondary to DM. She is a patient of Dr. Figueredo. She reports doing well on the Protonix 40 mg daily for GERD. She denies any breakthrough reflux symptoms. She denies any abd pain, dysphagia, diarrhea or constipation. She denies any rectal bleeding. She admits she takes Reglan 10 mg 2-3 times a day most days. She has taken it for a long time and admits it really helps her. She is able to eat meals without N&V or abd pain. She denies any tardive dyskinesia symptoms. She does have hx of depression and anxiety but admits Prozac helps her with those symptoms. She does report loose bowels but denies any fecal urgency or incontinence. She is beginning to wonder if dairy is making her stools more loose and causing some stomach upset. She had a milkshake for dinner the other night and immediately after eating it she had abd discomfort, bloating, gas and some diarrhea. She is thinking about cutting out dairy from her diet. She has been looking into dairy free alternatives. Last Colonoscopy was normal except for some non-bleeding internal hemorrhoids in 2015 with Dr. Figueredo.  She has FH of colon cancer (maternal grandmother).    HPI    Past Medical History:   Diagnosis Date   • Allergic rhinitis    • Anemia    • Anxiety    • Back pain    • CTS (carpal tunnel syndrome)    • Depression    • Diabetes mellitus    • Esophageal reflux    • Esophagitis, reflux    • Fatigue    • Fracture of left ankle 12/24/2015   • Hypercholesterolemia    • Hyperlipidemia    • Hypertension    • Hypokalemia    • Hyponatremia    • IBS (irritable bowel syndrome)    • Joint pain, knee     both knees   • Osteoarthritis, knee    • Osteoarthritis, shoulder    • Plantar fasciitis    • Sleep apnea        Past Surgical History:   Procedure Laterality Date   • ANKLE SURGERY Left  2017   •  SECTION     • COLONOSCOPY  2015    Non-bleeding internal hemorrhoids   • HYSTEROSCOPY ENDOMETRIAL ABLATION     • KNEE ARTHROSCOPY Bilateral    • KNEE SURGERY Right 02/10/2017   • REPLACEMENT TOTAL KNEE Right 2017   • TONSILLECTOMY     • TOTAL KNEE ARTHROPLASTY Bilateral    • TUBAL ABDOMINAL LIGATION         Scheduled Meds:    Continuous Infusions:  No current facility-administered medications for this visit.     PRN Meds:.    No Known Allergies    Social History     Social History   • Marital status:      Spouse name: N/A   • Number of children: N/A   • Years of education: N/A     Occupational History   • Not on file.     Social History Main Topics   • Smoking status: Current Every Day Smoker     Packs/day: 2.00     Types: Cigarettes     Start date:    • Smokeless tobacco: Never Used   • Alcohol use Yes      Comment: social   • Drug use: No   • Sexual activity: Not on file     Other Topics Concern   • Not on file     Social History Narrative       Family History   Problem Relation Age of Onset   • Arthritis Other    • Cancer Other    • Heart disease Other    • Hyperlipidemia Other    • Stroke Other    • Colon cancer Maternal Grandmother        Review of Systems   Constitutional: Negative for appetite change, chills, diaphoresis, fatigue, fever and unexpected weight change.   HENT: Negative for nosebleeds, postnasal drip, sore throat, trouble swallowing and voice change.    Respiratory: Negative for cough, choking, chest tightness, shortness of breath and wheezing.    Cardiovascular: Negative for chest pain.   Gastrointestinal: Negative for abdominal distention, abdominal pain, blood in stool, constipation, diarrhea, nausea and vomiting.   Endocrine: Negative for polydipsia, polyphagia and polyuria.   Musculoskeletal: Negative for gait problem.   Skin: Negative for rash and wound.   Allergic/Immunologic: Negative for food allergies.   Neurological: Negative for  dizziness, speech difficulty and light-headedness.   Psychiatric/Behavioral: Negative for confusion, self-injury, sleep disturbance and suicidal ideas.       Vitals:    02/20/18 0921   BP: 136/86   Temp: 98.5 °F (36.9 °C)       Physical Exam   Constitutional: She is oriented to person, place, and time. She appears well-developed and well-nourished. She does not appear ill. No distress.   HENT:   Head: Normocephalic.   Eyes: Pupils are equal, round, and reactive to light.   Cardiovascular: Normal rate, regular rhythm and normal heart sounds.    Pulmonary/Chest: Effort normal and breath sounds normal.   Abdominal: Soft. Bowel sounds are normal. She exhibits no distension and no mass. There is no hepatosplenomegaly. There is no tenderness. There is no rebound and no guarding. No hernia.   Musculoskeletal: Normal range of motion.   Neurological: She is alert and oriented to person, place, and time.   Skin: Skin is warm and dry.   Psychiatric: She has a normal mood and affect. Her speech is normal and behavior is normal. Judgment normal.       No images are attached to the encounter.    Assessment & Plan     1. Gastroparesis    - metoclopramide (REGLAN) 10 MG tablet; Take 1 tablet by mouth 3 (Three) Times a Day.  Dispense: 270 tablet; Refill: 3    2. Gastroesophageal reflux disease, esophagitis presence not specified    - pantoprazole (PROTONIX) 40 MG EC tablet; Take 1 tablet every morning before breakfast  Dispense: 90 tablet; Refill: 3    Gastroparesis seems to be doing well on Reglan. Patient does not want to change meds at this time. She reports taking Reglan for a long time and its always helped her. I do not see any signs of tardive dyskinesia and I did discuss with her the risks with taking Reglan long term. She agrees to continue to it for now. GERD seems well controlled on the Protonix. I advised her to avoid dairy if she can to help with some of her recent symptoms. Next surveillance colonoscopy should be in  2020. Follow up with me or Dr. Figueredo in 1 year or sooner as needed.

## 2018-02-20 NOTE — PATIENT INSTRUCTIONS
Continue Reglan 10 mg up to 3 times a day as directed  Continue protonix 40 mg daily   Call with any issues  Next Colonoscopy due 2020  Follow up with me or Dr. Figueredo in 1 year

## 2018-03-07 ENCOUNTER — OFFICE VISIT (OUTPATIENT)
Dept: PAIN MEDICINE | Facility: CLINIC | Age: 58
End: 2018-03-07

## 2018-03-07 VITALS
DIASTOLIC BLOOD PRESSURE: 80 MMHG | RESPIRATION RATE: 16 BRPM | SYSTOLIC BLOOD PRESSURE: 122 MMHG | HEART RATE: 99 BPM | HEIGHT: 66 IN | BODY MASS INDEX: 35.68 KG/M2 | WEIGHT: 222 LBS | TEMPERATURE: 98.4 F | OXYGEN SATURATION: 98 %

## 2018-03-07 DIAGNOSIS — G89.29 OTHER CHRONIC PAIN: Primary | ICD-10-CM

## 2018-03-07 DIAGNOSIS — G89.29 CHRONIC PAIN OF BOTH KNEES: ICD-10-CM

## 2018-03-07 DIAGNOSIS — Z79.899 ENCOUNTER FOR LONG-TERM CURRENT USE OF HIGH RISK MEDICATION: ICD-10-CM

## 2018-03-07 DIAGNOSIS — M25.562 CHRONIC PAIN OF BOTH KNEES: ICD-10-CM

## 2018-03-07 DIAGNOSIS — M25.561 CHRONIC PAIN OF BOTH KNEES: ICD-10-CM

## 2018-03-07 DIAGNOSIS — M17.0 PRIMARY OSTEOARTHRITIS OF BOTH KNEES: ICD-10-CM

## 2018-03-07 PROCEDURE — 80305 DRUG TEST PRSMV DIR OPT OBS: CPT | Performed by: NURSE PRACTITIONER

## 2018-03-07 PROCEDURE — 99213 OFFICE O/P EST LOW 20 MIN: CPT | Performed by: NURSE PRACTITIONER

## 2018-03-07 RX ORDER — HYDROCODONE BITARTRATE AND ACETAMINOPHEN 7.5; 325 MG/1; MG/1
1 TABLET ORAL EVERY 8 HOURS PRN
Qty: 90 TABLET | Refills: 0 | Status: SHIPPED | OUTPATIENT
Start: 2018-03-07 | End: 2018-03-29 | Stop reason: SDUPTHER

## 2018-03-07 NOTE — PROGRESS NOTES
CHIEF COMPLAINT  Pt states bilateral knee pain has moderately reduced since 1/9/18 office visit.    Subjective   Amanda Bettencourt is a 57 y.o. female  who presents to the office for follow-up.She has a history of chronic bilateral knee pain. Pain overall unchanged, stable.      Complains of pain in her knees bilaterally. Today her pain is 5/10VAS. Continues with Hydrocodone 7.5/325 3/day and gabapentin 800 mg 3/day.  Denies any side effects from the regimen. The regimen helps decrease her pain by 50%. Notes improvement in function and activity with regimen. ADL's by self.    Knee Pain    Incident onset: chronic problem. The pain is present in the left knee and right knee (TKA left knee 2005 and right knee 2007, left knee cap [id number], right TKA 2017). The quality of the pain is described as aching. The pain is at a severity of 5/10. The pain has been constant (unchanged since last office visit) since onset. Pertinent negatives include no numbness. The symptoms are aggravated by movement (walking, prolonged sitting). She has tried ice (Maintained on hydrocodone 7.5/325 3/day, Gabapentin 600 mg TID) for the symptoms. The treatment provided moderate relief.      PEG Assessment   What number best describes your pain on average in the past week?5  What number best describes how, during the past week, pain has interfered with your enjoyment of life?5  What number best describes how, during the past week, pain has interfered with your general activity?  6    The following portions of the patient's history were reviewed and updated as appropriate: allergies, current medications, past family history, past medical history, past social history, past surgical history and problem list.    Review of Systems   Constitutional: Negative for activity change, appetite change, chills and fever.   Respiratory: Positive for apnea. Negative for shortness of breath.    Cardiovascular: Negative for chest pain.   Gastrointestinal:  "Negative for constipation, diarrhea, nausea and vomiting.   Genitourinary: Negative for difficulty urinating and dyspareunia.   Musculoskeletal: Positive for arthralgias.        Knee and ankle   Neurological: Negative for dizziness, weakness, light-headedness, numbness and headaches.   Psychiatric/Behavioral: Positive for sleep disturbance. Negative for confusion, hallucinations, self-injury and suicidal ideas. The patient is nervous/anxious.      Vitals:    03/07/18 0934   BP: 122/80   Pulse: 99   Resp: 16   Temp: 98.4 °F (36.9 °C)   SpO2: 98%   Weight: 101 kg (222 lb)   Height: 167.6 cm (65.98\")   PainSc: 5  Comment: bilat. knee pain ranges from 3-7/10   PainLoc: Knee     Objective   Physical Exam   Constitutional: She is oriented to person, place, and time. She appears well-developed and well-nourished. She is cooperative.   HENT:   Head: Normocephalic and atraumatic.   Nose: Nose normal.   Eyes: Conjunctivae and lids are normal.   Neck: Trachea normal.   Cardiovascular: Normal rate.    Pulmonary/Chest: Effort normal. No respiratory distress.   Musculoskeletal:        Right knee: She exhibits decreased range of motion and swelling. She exhibits no effusion and no ecchymosis. Tenderness found.        Left knee: She exhibits decreased range of motion and swelling. She exhibits no effusion and no ecchymosis. Tenderness found.        Left ankle: She exhibits decreased range of motion.   Neurological: She is alert and oriented to person, place, and time. Gait (altered by LLE ) abnormal.   Skin: Skin is warm, dry and intact.   Psychiatric: She has a normal mood and affect. Her speech is normal and behavior is normal. Judgment and thought content normal. Cognition and memory are normal.   Nursing note and vitals reviewed.    Assessment/Plan   Amanda was seen today for knee pain.    Diagnoses and all orders for this visit:    Other chronic pain    Primary osteoarthritis of both knees    Chronic pain of both " knees    Encounter for long-term current use of high risk medication    Other orders  -     HYDROcodone-acetaminophen (NORCO) 7.5-325 MG per tablet; Take 1 tablet by mouth Every 8 (Eight) Hours As Needed for Severe Pain .      --- Refill Hydrocodone. Patient appears stable with current regimen. No adverse effects. Regarding continuation of opioids, there is no evidence of aberrant behavior or any red flags.  The patient continues with appropriate response to opioid therapy. ADL's remain intact by self.   --- Routine UDS in office today as part of monitoring requirements for controlled substances.  The specimen was viewed and the immunoassay result reviewed and is +OPI, TCA.  This specimen will be sent to [email or web address] laboratory for confirmation.     --- Follow-up 2 months          BENSON REPORT    As part of the patient's treatment plan, I am prescribing controlled substances. The patient has been made aware of appropriate use of such medications, including potential risk of somnolence, limited ability to drive and/or work safely, and the potential for dependence or overdose. It has also bee made clear that these medications are for use by this patient only, without concomitant use of alcohol or other substances unless prescribed.     Patient has completed prescribing agreement detailing terms of continued prescribing of controlled substances, including monitoring BENSON reports, urine drug screening, and pill counts if necessary. The patient is aware that inappropriate use will results in cessation of prescribing such medications.    BENSON report has been reviewed and scanned into the patient's chart.    Date of last BENSON : 3/6/2018    History and physical exam exhibit continued safe and appropriate use of controlled substances.    EMR Dragon/Transcription disclaimer:   Much of this encounter note is an electronic transcription/translation of spoken language to printed text. The electronic translation of spoken  language may permit erroneous, or at times, nonsensical words or phrases to be inadvertently transcribed; Although I have reviewed the note for such errors, some may still exist.

## 2018-03-12 ENCOUNTER — RESULTS ENCOUNTER (OUTPATIENT)
Dept: PAIN MEDICINE | Facility: CLINIC | Age: 58
End: 2018-03-12

## 2018-03-12 DIAGNOSIS — M17.0 PRIMARY OSTEOARTHRITIS OF BOTH KNEES: ICD-10-CM

## 2018-03-12 DIAGNOSIS — Z79.899 ENCOUNTER FOR LONG-TERM CURRENT USE OF HIGH RISK MEDICATION: ICD-10-CM

## 2018-03-12 DIAGNOSIS — M25.561 CHRONIC PAIN OF BOTH KNEES: ICD-10-CM

## 2018-03-12 DIAGNOSIS — G89.29 CHRONIC PAIN OF BOTH KNEES: ICD-10-CM

## 2018-03-12 DIAGNOSIS — M25.562 CHRONIC PAIN OF BOTH KNEES: ICD-10-CM

## 2018-03-12 DIAGNOSIS — G89.29 OTHER CHRONIC PAIN: ICD-10-CM

## 2018-03-19 VITALS
DIASTOLIC BLOOD PRESSURE: 94 MMHG | RESPIRATION RATE: 20 BRPM | HEIGHT: 66 IN | WEIGHT: 222.5 LBS | HEART RATE: 84 BPM | SYSTOLIC BLOOD PRESSURE: 168 MMHG | OXYGEN SATURATION: 100 % | TEMPERATURE: 97 F | BODY MASS INDEX: 35.76 KG/M2

## 2018-03-29 RX ORDER — HYDROCODONE BITARTRATE AND ACETAMINOPHEN 7.5; 325 MG/1; MG/1
1 TABLET ORAL EVERY 8 HOURS PRN
Qty: 90 TABLET | Refills: 0 | Status: SHIPPED | OUTPATIENT
Start: 2018-03-29 | End: 2018-05-02 | Stop reason: SDUPTHER

## 2018-03-29 NOTE — TELEPHONE ENCOUNTER
Medication Refill Request    Date of phone call: 3-28-18    Medication being requested: Hydrocodone-apap 7.5-325 mg sig: Take 1 tablet by mouth every 8 hours as needed for severe pain  Qty: 90 tablets    Date of last visit: 3-7-18    Date of last refill: 3-7-18    BENSON up to date?: 3-6-18    Next Follow up?: 5-4-18    Any new pertinent information? (i.e, new medication allergies, new use of medications, change in patient's health or condition, non-compliance or inconsistency with prescribing agreement?): n/a

## 2018-05-02 ENCOUNTER — OFFICE VISIT (OUTPATIENT)
Dept: PAIN MEDICINE | Facility: CLINIC | Age: 58
End: 2018-05-02

## 2018-05-02 VITALS
BODY MASS INDEX: 35.68 KG/M2 | DIASTOLIC BLOOD PRESSURE: 94 MMHG | HEIGHT: 66 IN | WEIGHT: 222 LBS | RESPIRATION RATE: 18 BRPM | TEMPERATURE: 97.3 F | SYSTOLIC BLOOD PRESSURE: 147 MMHG | HEART RATE: 97 BPM | OXYGEN SATURATION: 99 %

## 2018-05-02 DIAGNOSIS — Z79.899 ENCOUNTER FOR LONG-TERM CURRENT USE OF HIGH RISK MEDICATION: ICD-10-CM

## 2018-05-02 DIAGNOSIS — M25.562 CHRONIC PAIN OF BOTH KNEES: ICD-10-CM

## 2018-05-02 DIAGNOSIS — G89.29 OTHER CHRONIC PAIN: Primary | ICD-10-CM

## 2018-05-02 DIAGNOSIS — M17.0 PRIMARY OSTEOARTHRITIS OF BOTH KNEES: ICD-10-CM

## 2018-05-02 DIAGNOSIS — G89.29 CHRONIC PAIN OF BOTH KNEES: ICD-10-CM

## 2018-05-02 DIAGNOSIS — M25.561 CHRONIC PAIN OF BOTH KNEES: ICD-10-CM

## 2018-05-02 PROCEDURE — 99213 OFFICE O/P EST LOW 20 MIN: CPT | Performed by: NURSE PRACTITIONER

## 2018-05-02 RX ORDER — HYDROCODONE BITARTRATE AND ACETAMINOPHEN 7.5; 325 MG/1; MG/1
1 TABLET ORAL EVERY 8 HOURS PRN
Qty: 90 TABLET | Refills: 0 | Status: SHIPPED | OUTPATIENT
Start: 2018-05-02 | End: 2018-05-25 | Stop reason: SDUPTHER

## 2018-05-02 NOTE — PROGRESS NOTES
CHIEF COMPLAINT  Bilateral knee pain is unchanged since last office visit.    Subjective   Amanda Bettencourt is a 57 y.o. female  who presents to the office for follow-up.She has a history of knee pain.    Complains of pain in her knees bilaterally. Today her pain is 5/10VAS. Continues with Hydrocodone 7.5/325 3/day and gabapentin 800 mg 3/day.  Denies any side effects from the regimen. The regimen helps decrease her pain by 50%. Notes improvement in function and activity with regimen. ADL's by self.    Interested in weaning medication at next visit.      Knee Pain    Incident onset: chronic problem. The pain is present in the left knee and right knee (TKA left knee 2005 and right knee 2007, left knee cap [id number], right TKA 2017). The quality of the pain is described as aching. The pain is at a severity of 5/10. The pain has been constant (unchanged since last office visit) since onset. Pertinent negatives include no numbness. The symptoms are aggravated by movement (walking, prolonged sitting). She has tried ice (Maintained on hydrocodone 7.5/325 3/day, Gabapentin 600 mg TID) for the symptoms. The treatment provided moderate relief.      PEG Assessment   What number best describes your pain on average in the past week?6  What number best describes how, during the past week, pain has interfered with your enjoyment of life?5  What number best describes how, during the past week, pain has interfered with your general activity?  5    The following portions of the patient's history were reviewed and updated as appropriate: allergies, current medications, past family history, past medical history, past social history, past surgical history and problem list.    Review of Systems   Constitutional: Negative for chills and fever.   Respiratory: Negative for shortness of breath.    Cardiovascular: Negative for chest pain.   Gastrointestinal: Positive for diarrhea. Negative for constipation, nausea and vomiting.  "  Genitourinary: Negative for difficulty urinating, dyspareunia and dysuria.   Musculoskeletal:        Knee pain   Neurological: Positive for headaches. Negative for dizziness, weakness, light-headedness and numbness.   Psychiatric/Behavioral: Positive for sleep disturbance. Negative for confusion, hallucinations, self-injury and suicidal ideas. The patient is not nervous/anxious.      Vitals:    05/02/18 0856   BP: 147/94   Pulse: 97   Resp: 18   Temp: 97.3 °F (36.3 °C)   SpO2: 99%   Weight: 101 kg (222 lb)   Height: 167.6 cm (66\")   PainSc:   5   PainLoc: Knee       Objective   Physical Exam   Constitutional: She is oriented to person, place, and time. She appears well-developed and well-nourished. She is cooperative.   HENT:   Head: Normocephalic and atraumatic.   Nose: Nose normal.   Eyes: Conjunctivae and lids are normal.   Neck: Trachea normal.   Cardiovascular: Normal rate.    Pulmonary/Chest: Effort normal. No respiratory distress.   Musculoskeletal:        Right knee: She exhibits decreased range of motion and swelling. She exhibits no effusion and no ecchymosis. Tenderness found.        Left knee: She exhibits decreased range of motion and swelling. She exhibits no effusion and no ecchymosis. Tenderness found.   Neurological: She is alert and oriented to person, place, and time. Gait (altered by LLE ) abnormal.   Skin: Skin is warm, dry and intact.   Psychiatric: She has a normal mood and affect. Her speech is normal and behavior is normal. Judgment and thought content normal. Cognition and memory are normal.   Nursing note and vitals reviewed.    Assessment/Plan   Amanda was seen today for knee pain.    Diagnoses and all orders for this visit:    Other chronic pain    Primary osteoarthritis of both knees    Chronic pain of both knees    Encounter for long-term current use of high risk medication    Other orders  -     HYDROcodone-acetaminophen (NORCO) 7.5-325 MG per tablet; Take 1 tablet by mouth Every 8 " (Eight) Hours As Needed for Severe Pain .      --- Refill hydrocodone. Patient appears stable with current regimen. No adverse effects. Regarding continuation of opioids, there is no evidence of aberrant behavior or any red flags.  The patient continues with appropriate response to opioid therapy. ADL's remain intact by self.   --- The urine drug screen confirmation from 3/7/18 has been reviewed and the result is appropriate based on patient history and BENSON report  --- Follow-up 2 months          BENSON REPORT  As part of the patient's treatment plan, I am prescribing controlled substances. The patient has been made aware of appropriate use of such medications, including potential risk of somnolence, limited ability to drive and/or work safely, and the potential for dependence or overdose. It has also bee made clear that these medications are for use by this patient only, without concomitant use of alcohol or other substances unless prescribed.     Patient has completed prescribing agreement detailing terms of continued prescribing of controlled substances, including monitoring BENSON reports, urine drug screening, and pill counts if necessary. The patient is aware that inappropriate use will results in cessation of prescribing such medications.    BENSON report has been reviewed and scanned into the patient's chart.    Date of last BENSON : 5/1/2018    History and physical exam exhibit continued safe and appropriate use of controlled substances.    EMR Dragon/Transcription disclaimer:   Much of this encounter note is an electronic transcription/translation of spoken language to printed text. The electronic translation of spoken language may permit erroneous, or at times, nonsensical words or phrases to be inadvertently transcribed; Although I have reviewed the note for such errors, some may still exist.

## 2018-05-25 RX ORDER — HYDROCODONE BITARTRATE AND ACETAMINOPHEN 7.5; 325 MG/1; MG/1
1 TABLET ORAL EVERY 8 HOURS PRN
Qty: 90 TABLET | Refills: 0 | Status: SHIPPED | OUTPATIENT
Start: 2018-05-25 | End: 2019-02-26

## 2018-05-25 NOTE — TELEPHONE ENCOUNTER
Medication Refill Request    Date of phone call: 5-24-18    Medication being requested: Hydrocodone-apap 7.5-325 mg sig: Take 1 tablet by mouth every 8 hours as needed  Qty: 90 tablets    Date of last visit: 5-02-18    Date of last refill: 5-02-18    BENSON up to date?: 5-01-18    Next Follow up?: 6-28-18    Any new pertinent information? (i.e, new medication allergies, new use of medications, change in patient's health or condition, non-compliance or inconsistency with prescribing agreement?): n/a

## 2018-05-30 ENCOUNTER — TELEPHONE (OUTPATIENT)
Dept: PAIN MEDICINE | Facility: CLINIC | Age: 58
End: 2018-05-30

## 2018-05-30 NOTE — TELEPHONE ENCOUNTER
FYI - Patient called and LM on refill line and was upset because she could not get her Hydrocodone filled until 6/3/18 and she requested that we call the pharmacy to give the OK to go ahead and fill. I explained to her that we cannot do that because she isn't due for the medication until 6/3/18.    She then asked if we can send it to [facility] on [facility], she states they could go ahead and fill it and that she has switched all of her medication there.    There is an updated Loyd in the chart.

## 2018-06-14 ENCOUNTER — TELEPHONE (OUTPATIENT)
Dept: PAIN MEDICINE | Facility: CLINIC | Age: 58
End: 2018-06-14

## 2018-06-22 ENCOUNTER — TELEPHONE (OUTPATIENT)
Dept: GASTROENTEROLOGY | Facility: CLINIC | Age: 58
End: 2018-06-22

## 2018-06-22 NOTE — TELEPHONE ENCOUNTER
----- Message from Mariaa Casiano sent at 6/22/2018 12:24 PM EDT -----  Regarding: PT CALLED - DIARRHEA  Contact: [phone]  PT PREVIOUSLY HAD C DIFF. NOW SHE IS BEGINNING TO HAVE DIARRHEA AGAIN IN THE AM & NAUSEA. PT HAS SOME QUESTIONS. WOULD LIKE TO SPEAK TO SOMEONE.

## 2018-06-22 NOTE — TELEPHONE ENCOUNTER
Called pt back. Pt states she was diagnosed with c-diff back in May right after [place]. She had a really bad sinus infection and had to go on an antibiotic for that. Then she developed c-diff because of that and was treated by her PMD for that. Pt states she has continued to have loose stools even after completing her antibiotic for the infection. She states she has 3-4 loose stools in the AM and she has nausea off and on. She states she has IBS anyway, so she is not sure if it is her IBS that is flared up or what. She states she put a call into her PMD office a little while ago to ask about getting something for nausea and she is waiting for a call back. Advised that both Dr Figueredo and the NP are out of the office today. Advised she should speak to her PMD about her continued symptoms and see what she recommends for her. Advised she can go ahead and start a probiotic if she is not already on ne. Pt verb understanding.

## 2018-11-15 ENCOUNTER — TELEPHONE (OUTPATIENT)
Dept: GASTROENTEROLOGY | Facility: CLINIC | Age: 58
End: 2018-11-15

## 2018-11-15 NOTE — TELEPHONE ENCOUNTER
----- Message from Mariaa Casiano sent at 11/15/2018  9:29 AM EST -----  Regarding: REFILL ON REGLAN    Contact: [phone]  PT STATED [facility] HAD TRY TO CONTACT US. I DON'T SEE ANYTHING. SHE DOESN'T KNOW WHY THEY WON'T REFILL. THERE ALL REFILLS ON HER SCRIPT. SHE'S ALMOST OUT OF MEDS.

## 2018-11-15 NOTE — TELEPHONE ENCOUNTER
Returned patient's phone call. She states [facility] would not refill her Reglan. Advised will call [facility]. Phone call made to [phone] spoke with Ammy Hsieh, who states the recommendation is for the patient to be on Reglan for 12 weeks. Advised patient's diagnosis is gastroparesis and is will need to take Reglan daily. She verb understanding and will mail the medication today.  Patient called, no answer, update patient her medication will be mailed today.

## 2018-11-21 ENCOUNTER — TRANSCRIBE ORDERS (OUTPATIENT)
Dept: ADMINISTRATIVE | Facility: HOSPITAL | Age: 58
End: 2018-11-21

## 2018-11-21 DIAGNOSIS — F17.200 TOBACCO DEPENDENCE: Primary | ICD-10-CM

## 2018-11-21 DIAGNOSIS — J45.909 ASTHMA, ACUTE: ICD-10-CM

## 2018-11-21 DIAGNOSIS — J42 CHRONIC BRONCHITIS, UNSPECIFIED CHRONIC BRONCHITIS TYPE (HCC): ICD-10-CM

## 2018-12-05 ENCOUNTER — TELEPHONE (OUTPATIENT)
Dept: GASTROENTEROLOGY | Facility: CLINIC | Age: 58
End: 2018-12-05

## 2018-12-05 NOTE — TELEPHONE ENCOUNTER
----- Message from Frances Bains sent at 12/5/2018 11:27 AM EST -----  Regarding: pantoprazole  Contact: [phone]  Pt wants know if its okay to take 2 dose.. Pt is complain of stomach pain

## 2018-12-05 NOTE — TELEPHONE ENCOUNTER
"Pt returned my call per a staff message.    Called pt back. Pt states yesterday her stomach started \"burning\" and it was very uncomfortable. Today she feels much better but she was wondering if she should increase the pantoprazole to twice daily? Advised would not increase the med yet since she is feeling better. Advised if she needs additional coverage can add Pepcid or Zantac OTC at nighttime as needed. Advised that if she needs to add the med frequently call back and make an appt to be seen. Pt verb understanding.   "

## 2018-12-06 ENCOUNTER — APPOINTMENT (OUTPATIENT)
Dept: RESPIRATORY THERAPY | Facility: HOSPITAL | Age: 58
End: 2018-12-06

## 2018-12-10 ENCOUNTER — OFFICE VISIT (OUTPATIENT)
Dept: GASTROENTEROLOGY | Facility: CLINIC | Age: 58
End: 2018-12-10

## 2018-12-10 VITALS
WEIGHT: 222.2 LBS | BODY MASS INDEX: 35.71 KG/M2 | TEMPERATURE: 98.8 F | HEIGHT: 66 IN | DIASTOLIC BLOOD PRESSURE: 74 MMHG | SYSTOLIC BLOOD PRESSURE: 130 MMHG

## 2018-12-10 DIAGNOSIS — Z86.19 HISTORY OF CLOSTRIDIUM DIFFICILE INFECTION: ICD-10-CM

## 2018-12-10 DIAGNOSIS — K21.9 GASTROESOPHAGEAL REFLUX DISEASE, ESOPHAGITIS PRESENCE NOT SPECIFIED: Primary | ICD-10-CM

## 2018-12-10 DIAGNOSIS — R19.7 DIARRHEA, UNSPECIFIED TYPE: ICD-10-CM

## 2018-12-10 DIAGNOSIS — K31.84 GASTROPARESIS: ICD-10-CM

## 2018-12-10 PROCEDURE — 99214 OFFICE O/P EST MOD 30 MIN: CPT | Performed by: NURSE PRACTITIONER

## 2018-12-10 RX ORDER — ROSUVASTATIN CALCIUM 40 MG/1
40 TABLET, COATED ORAL DAILY
COMMUNITY
Start: 2018-10-19

## 2018-12-10 NOTE — PROGRESS NOTES
Chief Complaint   Patient presents with   • Follow-up   • Heartburn   • GI Problem     gastroparesis       Amanda Bettencourt is a  58 y.o. female here for a follow up visit for GERD.    HPI  57 yo f presents today for follow up visit for GERD and gastroparesis. She is a patient of Dr. Figueredo. She was last seen in the office on 18. She has hx GERD and admits she does well most of the time on Protonix 40 mg daily. But she admits there are days she has some breakthrough reflux symptoms. She also has hx gastroparesis and admits she is still doing well on Reglan. She denies any signs of tardive dyskinesia. She admits she eats well and the N&V is well controlled on the Reglan. She denies any abd pain, N&V, diarrhea, constipation, rectal bleeding or melena. She admit lately she has been having a lot more bloating and gas since having C-diff after taking several ABX for a recurrent sinus infection. She admits she's done with the ABX for the C-diff and her PCP checked her and she was negative for C-diff. She admits though her bowels just have not been the same since. She is taking a probiotic but admits she takes the same one all the time. She admits she had C-diff this past spring. She admits her appetite is good and her weight is stable.     Past Medical History:   Diagnosis Date   • Allergic rhinitis    • Anemia    • Anxiety    • Back pain    • CTS (carpal tunnel syndrome)    • Depression    • Diabetes mellitus (CMS/[facility])    • Esophageal reflux    • Esophagitis, reflux    • Fatigue    • Fracture of left ankle 2015   • Hypercholesterolemia    • Hyperlipidemia    • Hypertension    • Hypokalemia    • Hyponatremia    • IBS (irritable bowel syndrome)    • Joint pain, knee     both knees   • Osteoarthritis, knee    • Osteoarthritis, shoulder    • Plantar fasciitis    • Sleep apnea        Past Surgical History:   Procedure Laterality Date   • ANKLE SURGERY Left 2017   •  SECTION     • COLONOSCOPY   06/19/2015    Non-bleeding internal hemorrhoids   • HYSTEROSCOPY ENDOMETRIAL ABLATION     • KNEE ARTHROSCOPY Bilateral    • KNEE SURGERY Right 02/10/2017   • REPLACEMENT TOTAL KNEE Right 01/20/2017   • TONSILLECTOMY     • TOTAL KNEE ARTHROPLASTY Bilateral    • TUBAL ABDOMINAL LIGATION         Scheduled Meds:    Continuous Infusions:  No current facility-administered medications for this visit.     PRN Meds:.    No Known Allergies    Social History     Socioeconomic History   • Marital status:      Spouse name: Not on file   • Number of children: Not on file   • Years of education: Not on file   • Highest education level: Not on file   Social Needs   • Financial resource strain: Not on file   • Food insecurity - worry: Not on file   • Food insecurity - inability: Not on file   • Transportation needs - medical: Not on file   • Transportation needs - non-medical: Not on file   Occupational History   • Not on file   Tobacco Use   • Smoking status: Current Every Day Smoker     Packs/day: 2.00     Types: Cigarettes     Start date: 1976   • Smokeless tobacco: Never Used   • Tobacco comment: Began smoking age 19.  Although she is currently smoking 2 ppd, she smoked 1.25 ppd for about 11 years and then 2.25 ppd an average for 26 years for a 72.25 pack year history.   Substance and Sexual Activity   • Alcohol use: Yes     Comment: social   • Drug use: No   • Sexual activity: Defer   Other Topics Concern   • Not on file   Social History Narrative   • Not on file       Family History   Problem Relation Age of Onset   • Arthritis Other    • Cancer Other    • Heart disease Other    • Hyperlipidemia Other    • Stroke Other    • Colon cancer Maternal Grandmother        Review of Systems   Constitutional: Negative for appetite change, chills, diaphoresis, fatigue, fever and unexpected weight change.   HENT: Negative for nosebleeds, postnasal drip, sore throat, trouble swallowing and voice change.    Respiratory: Negative for  cough, choking, chest tightness, shortness of breath and wheezing.    Cardiovascular: Negative for chest pain.   Gastrointestinal: Positive for abdominal distention. Negative for abdominal pain, anal bleeding, blood in stool, constipation, diarrhea, nausea, rectal pain and vomiting.   Endocrine: Negative for polydipsia, polyphagia and polyuria.   Musculoskeletal: Negative for gait problem.   Skin: Negative for rash and wound.   Allergic/Immunologic: Negative for food allergies.   Neurological: Negative for dizziness, speech difficulty and light-headedness.   Psychiatric/Behavioral: Negative for confusion, self-injury, sleep disturbance and suicidal ideas.       Vitals:    12/10/18 1304   BP: 130/74   Temp: 98.8 °F (37.1 °C)       Physical Exam   Constitutional: She is oriented to person, place, and time. She appears well-developed and well-nourished. She does not appear ill. No distress.   HENT:   Head: Normocephalic.   Eyes: Pupils are equal, round, and reactive to light.   Cardiovascular: Normal rate, regular rhythm and normal heart sounds.   Pulmonary/Chest: Effort normal and breath sounds normal.   Abdominal: Soft. Bowel sounds are normal. She exhibits no distension and no mass. There is no hepatosplenomegaly. There is no tenderness. There is no rebound and no guarding. No hernia.   Musculoskeletal: Normal range of motion.   Neurological: She is alert and oriented to person, place, and time.   Skin: Skin is warm and dry.   Psychiatric: She has a normal mood and affect. Her speech is normal and behavior is normal. Judgment normal.       No images are attached to the encounter.    Assessment & Plan    1. Gastroesophageal reflux disease, esophagitis presence not specified    2. Gastroparesis    3. Diarrhea, unspecified type    4. History of Clostridium difficile infection    GERD seems well controlled most of the time on Protonix 40 mg daily. Recommend she add some daily Zantac or PEPCID for breakthrough reflux  PRN. Gastroparesis seems well controlled on Reglan. Again discussed the potential risks of taking Reglan and patient is still adamant she doesn't want to change it since its working. Recommend she switch up her probiotics. Call office with any issues. Follow up with Dr. Figueredo in 2-3 months.

## 2018-12-13 ENCOUNTER — APPOINTMENT (OUTPATIENT)
Dept: RESPIRATORY THERAPY | Facility: HOSPITAL | Age: 58
End: 2018-12-13

## 2019-01-03 ENCOUNTER — APPOINTMENT (OUTPATIENT)
Dept: RESPIRATORY THERAPY | Facility: HOSPITAL | Age: 59
End: 2019-01-03

## 2019-02-12 ENCOUNTER — APPOINTMENT (OUTPATIENT)
Dept: RESPIRATORY THERAPY | Facility: HOSPITAL | Age: 59
End: 2019-02-12

## 2019-02-15 ENCOUNTER — TRANSCRIBE ORDERS (OUTPATIENT)
Dept: ADMINISTRATIVE | Facility: HOSPITAL | Age: 59
End: 2019-02-15

## 2019-02-15 DIAGNOSIS — M54.2 CERVICALGIA: Primary | ICD-10-CM

## 2019-02-15 DIAGNOSIS — M50.90 CERVICAL DISC DISEASE: ICD-10-CM

## 2019-02-18 DIAGNOSIS — K21.9 GASTROESOPHAGEAL REFLUX DISEASE, ESOPHAGITIS PRESENCE NOT SPECIFIED: ICD-10-CM

## 2019-02-18 RX ORDER — PANTOPRAZOLE SODIUM 40 MG/1
TABLET, DELAYED RELEASE ORAL
Qty: 90 TABLET | Refills: 1 | Status: SHIPPED | OUTPATIENT
Start: 2019-02-18 | End: 2019-05-02 | Stop reason: SDUPTHER

## 2019-02-26 ENCOUNTER — ANESTHESIA EVENT (OUTPATIENT)
Dept: PAIN MEDICINE | Facility: HOSPITAL | Age: 59
End: 2019-02-26

## 2019-02-26 ENCOUNTER — HOSPITAL ENCOUNTER (OUTPATIENT)
Dept: PAIN MEDICINE | Facility: HOSPITAL | Age: 59
Discharge: HOME | End: 2019-02-26
Admitting: ANESTHESIOLOGY

## 2019-02-26 ENCOUNTER — HOSPITAL ENCOUNTER (OUTPATIENT)
Dept: GENERAL RADIOLOGY | Facility: HOSPITAL | Age: 59
Discharge: HOME | End: 2019-02-26

## 2019-02-26 ENCOUNTER — ANESTHESIA (OUTPATIENT)
Dept: PAIN MEDICINE | Facility: HOSPITAL | Age: 59
End: 2019-02-26

## 2019-02-26 VITALS
HEIGHT: 66 IN | DIASTOLIC BLOOD PRESSURE: 74 MMHG | OXYGEN SATURATION: 93 % | TEMPERATURE: 98.1 F | BODY MASS INDEX: 35.03 KG/M2 | RESPIRATION RATE: 16 BRPM | HEART RATE: 92 BPM | SYSTOLIC BLOOD PRESSURE: 125 MMHG | WEIGHT: 218 LBS

## 2019-02-26 DIAGNOSIS — M50.30 DDD (DEGENERATIVE DISC DISEASE), CERVICAL: Primary | ICD-10-CM

## 2019-02-26 DIAGNOSIS — R52 PAIN: ICD-10-CM

## 2019-02-26 DIAGNOSIS — M54.12 CERVICAL RADICULOPATHY: ICD-10-CM

## 2019-02-26 PROCEDURE — C1755 CATHETER, INTRASPINAL: HCPCS

## 2019-02-26 PROCEDURE — 77003 FLUOROGUIDE FOR SPINE INJECT: CPT

## 2019-02-26 PROCEDURE — 25010000002 METHYLPREDNISOLONE PER 80 MG: Performed by: ANESTHESIOLOGY

## 2019-02-26 PROCEDURE — 25010000002 MIDAZOLAM PER 1 MG: Performed by: ANESTHESIOLOGY

## 2019-02-26 RX ORDER — SODIUM CHLORIDE 0.9 % (FLUSH) 0.9 %
1-10 SYRINGE (ML) INJECTION AS NEEDED
Status: DISCONTINUED | OUTPATIENT
Start: 2019-02-26 | End: 2019-02-27 | Stop reason: HOSPADM

## 2019-02-26 RX ORDER — METHYLPREDNISOLONE ACETATE 80 MG/ML
80 INJECTION, SUSPENSION INTRA-ARTICULAR; INTRALESIONAL; INTRAMUSCULAR; SOFT TISSUE ONCE
Status: COMPLETED | OUTPATIENT
Start: 2019-02-26 | End: 2019-02-26

## 2019-02-26 RX ORDER — SODIUM CHLORIDE 0.9 % (FLUSH) 0.9 %
3 SYRINGE (ML) INJECTION EVERY 12 HOURS SCHEDULED
Status: DISCONTINUED | OUTPATIENT
Start: 2019-02-26 | End: 2019-02-27 | Stop reason: HOSPADM

## 2019-02-26 RX ORDER — MIDAZOLAM HYDROCHLORIDE 1 MG/ML
1 INJECTION INTRAMUSCULAR; INTRAVENOUS AS NEEDED
Status: DISCONTINUED | OUTPATIENT
Start: 2019-02-26 | End: 2019-02-27 | Stop reason: HOSPADM

## 2019-02-26 RX ORDER — LIDOCAINE HYDROCHLORIDE 10 MG/ML
1 INJECTION, SOLUTION INFILTRATION; PERINEURAL ONCE AS NEEDED
Status: DISCONTINUED | OUTPATIENT
Start: 2019-02-26 | End: 2019-02-27 | Stop reason: HOSPADM

## 2019-02-26 RX ORDER — SODIUM CHLORIDE 0.9 % (FLUSH) 0.9 %
3-10 SYRINGE (ML) INJECTION AS NEEDED
Status: DISCONTINUED | OUTPATIENT
Start: 2019-02-26 | End: 2019-02-27 | Stop reason: HOSPADM

## 2019-02-26 RX ORDER — FENTANYL CITRATE 50 UG/ML
50 INJECTION, SOLUTION INTRAMUSCULAR; INTRAVENOUS AS NEEDED
Status: DISCONTINUED | OUTPATIENT
Start: 2019-02-26 | End: 2019-02-27 | Stop reason: HOSPADM

## 2019-02-26 RX ADMIN — METHYLPREDNISOLONE ACETATE 80 MG: 80 INJECTION, SUSPENSION INTRA-ARTICULAR; INTRALESIONAL; INTRAMUSCULAR; SOFT TISSUE at 08:22

## 2019-02-26 RX ADMIN — MIDAZOLAM 2 MG: 1 INJECTION INTRAMUSCULAR; INTRAVENOUS at 08:18

## 2019-02-26 NOTE — H&P
[facility]    History and Physical    Patient Name: Amanda Bettencourt  :  1960  MRN:  [id number]  Date of Admission: 2019    Subjective     Patient is a 58 y.o. female presents with chief complaint of chronic, moderate, severe, waxing and waning neck and arm: left pain.  Onset of symptoms was gradual starting 3 months ago.  Symptoms are associated/aggravated by activity, sitting or sitting & bending forward as one would when coloring or drawing. Symptoms improve with relaxation and pain medication.  Has tried PT which made it worse.  Presents for SAMMY 1.      The following portions of the patients history were reviewed and updated as appropriate: current medications, allergies, past medical history, past surgical history, past family history, past social history and problem list                Objective     Past Medical History:   Past Medical History:   Diagnosis Date   • Allergic rhinitis    • Anemia    • Anxiety    • Back pain    • CTS (carpal tunnel syndrome)    • Depression    • Diabetes mellitus (CMS/[facility])    • Esophageal reflux    • Esophagitis, reflux    • Fatigue    • Fracture of left ankle 2015   • Hypercholesterolemia    • Hyperlipidemia    • Hypertension    • Hypokalemia    • Hyponatremia    • IBS (irritable bowel syndrome)    • Joint pain, knee     both knees   • Osteoarthritis, knee    • Osteoarthritis, shoulder    • Plantar fasciitis    • Sleep apnea      Past Surgical History:   Past Surgical History:   Procedure Laterality Date   • ANKLE SURGERY Left 2017   •  SECTION     • COLONOSCOPY  2015    Non-bleeding internal hemorrhoids   • HYSTEROSCOPY ENDOMETRIAL ABLATION     • KNEE ARTHROSCOPY Bilateral    • KNEE SURGERY Right 02/10/2017   • REPLACEMENT TOTAL KNEE Right 2017   • TONSILLECTOMY     • TOTAL KNEE ARTHROPLASTY Bilateral    • TUBAL ABDOMINAL LIGATION       Family History:   Family History   Problem Relation Age of Onset   • Arthritis Other    •  Cancer Other    • Heart disease Other    • Hyperlipidemia Other    • Stroke Other    • Colon cancer Maternal Grandmother      Social History:   Social History     Tobacco Use   • Smoking status: Current Every Day Smoker     Packs/day: 2.00     Types: Cigarettes     Start date: 1976   • Smokeless tobacco: Never Used   • Tobacco comment: Began smoking age 19.  Although she is currently smoking 2 ppd, she smoked 1.25 ppd for about 11 years and then 2.25 ppd an average for 26 years for a 72.25 pack year history.   Substance Use Topics   • Alcohol use: Yes     Comment: social   • Drug use: No       Vital Signs Range for the last 24 hours  Temperature:     Temp Source:     BP:     Pulse:     Respirations:     SPO2:     O2 Amount (l/min):     O2 Devices     Weight:           --------------------------------------------------------------------------------    Current Outpatient Medications   Medication Sig Dispense Refill   • aspirin 81 MG tablet Take by mouth daily.     • Calcium Carbonate-Vit D-Min (CALCIUM 1200 PO) Take 1,200 mg by mouth Daily.     • cholecalciferol (VITAMIN D3) 19455 UNITS capsule Take 2,000 Units by mouth.     • ezetimibe (ZETIA) 10 MG tablet Take 1 tablet by mouth daily.     • FLUoxetine (PROzac) 20 MG capsule Take 4 capsules by mouth daily.     • gabapentin (NEURONTIN) 800 MG tablet Take 1 tablet by mouth 3 (Three) Times a Day. 270 tablet 1   • HYDROcodone-acetaminophen (NORCO) 7.5-325 MG per tablet Take 1 tablet by mouth Every 8 (Eight) Hours As Needed for Severe Pain . 90 tablet 0   • lisinopril-hydrochlorothiazide (PRINZIDE,ZESTORETIC) 20-12.5 MG per tablet Take 1 tablet by mouth 2 (two) times a day.     • metoclopramide (REGLAN) 10 MG tablet Take 1 tablet by mouth 3 (Three) Times a Day. 270 tablet 3   • minocycline (MINOCIN,DYNACIN) 100 MG capsule Take 1 capsule by mouth 2 (two) times a day.     • Multiple Vitamins-Minerals (MULTIVITAMIN ADULT PO) Take  by mouth.     • pantoprazole (PROTONIX) 40  MG EC tablet TAKE 1 TABLET EVERY MORNING BEFORE BREAKFAST 90 tablet 1   • potassium chloride ER (K-TAB) 20 MEQ tablet controlled-release ER tablet TK 1 T PO QD  5   • rosuvastatin (CRESTOR) 40 MG tablet Take 40 mg by mouth Daily.     • SitaGLIPtin-MetFORMIN HCl -1000 MG tablet sustained-release 24 hour Take by mouth.       No current facility-administered medications for this encounter.        --------------------------------------------------------------------------------  Assessment/Plan      Anesthesia Evaluation     Patient summary reviewed and Nursing notes reviewed   no history of anesthetic complications:  NPO Solid Status: > 8 hours      Pain impairs ability to perform ADLs: Exercise/Activity and Sleeping  Modalities previously tried to control pain with limited effectiveness within the last 4-6 weeks: Physical therapy, OTC medications, Prescription medications and Rest     Airway   Mallampati: II  TM distance: >3 FB  Neck ROM: full  Dental - normal exam     Pulmonary - normal exam   (+) a smoker Current Smoked day of surgery, sleep apnea,   Cardiovascular - normal exam  Exercise tolerance: good (4-7 METS)    (+) hypertension, hyperlipidemia,       Neuro/Psych- neuro exam normal  (+) numbness, psychiatric history,     normal reflexes and symmetric  GI/Hepatic/Renal/Endo    (+)  GERD,  diabetes mellitus,     Musculoskeletal (-) normal exam    (+) back pain, chronic pain, neck pain,   Abdominal  - normal exam   Substance History - negative use     OB/GYN negative ob/gyn ROS         Other   (+) arthritis                Diagnosis and Plan    Treatment Plan  ASA 2      Procedures: Cervical Epidural Steroid Injection(SAMMY), With fluoroscopy,       Anesthetic plan and risks discussed with patient.          Diagnosis     * DDD (degenerative disc disease), cervical [M50.30]     * Cervical radiculopathy [M54.12]

## 2019-02-26 NOTE — ANESTHESIA PROCEDURE NOTES
PAIN Epidural block    Pre-sedation assessment completed: 2/26/2019 8:07 AM    Patient reassessed immediately prior to procedure    Patient location during procedure: pain clinic  Start Time: 2/26/2019 8:13 AM  Stop Time: 2/26/2019 8:23 AM  Indication:procedure for pain  Performed By  Anesthesiologist: Fiona Del Angel MD  Preanesthetic Checklist  Completed: patient identified, site marked, surgical consent, pre-op evaluation, timeout performed, IV checked, risks and benefits discussed and monitors and equipment checked  Additional Notes  Cervical degeneration & radiculopathy  Fluoro used.    Prep:  Pt Position:prone  Sterile Tech:cap, gloves, mask and sterile barrier  Prep:chlorhexidine gluconate and isopropyl alcohol  Monitoring:blood pressure monitoring, continuous pulse oximetry and EKG  Procedure:  Sedation: yes   Approach:midline  Guidance: fluoroscopy  Location:cervical  Level:5-6  Needle Type:Tuohy  Needle Gauge:20  Aspiration:negative  Medications:  Depomedrol:80  Preservative Free Saline:3mL    Post Assessment:  Dressing:occlusive dressing applied  Pt Tolerance:patient tolerated the procedure well with no apparent complications  Complications:no

## 2019-03-19 ENCOUNTER — TELEPHONE (OUTPATIENT)
Dept: GASTROENTEROLOGY | Facility: CLINIC | Age: 59
End: 2019-03-19

## 2019-03-19 NOTE — TELEPHONE ENCOUNTER
----- Message from Mindy Abarca sent at 3/19/2019  2:37 PM EDT -----  Regarding: refill meds   Contact: [phone]  Pt is calling for a refill on her metoclopramide (REGLAN) 10 MG tablet. Says the refill has  & is asking for it to get called [facility]     pharm:[phone]

## 2019-03-20 ENCOUNTER — APPOINTMENT (OUTPATIENT)
Dept: PAIN MEDICINE | Facility: HOSPITAL | Age: 59
End: 2019-03-20

## 2019-03-20 RX ORDER — METOCLOPRAMIDE 10 MG/1
10 TABLET ORAL 3 TIMES DAILY
Qty: 90 TABLET | Refills: 0 | Status: SHIPPED | OUTPATIENT
Start: 2019-03-20 | End: 2019-03-29

## 2019-03-22 DIAGNOSIS — K31.84 GASTROPARESIS: ICD-10-CM

## 2019-03-25 RX ORDER — METOCLOPRAMIDE 10 MG/1
TABLET ORAL
Qty: 270 TABLET | Refills: 3 | OUTPATIENT
Start: 2019-03-25

## 2019-03-29 ENCOUNTER — HOSPITAL ENCOUNTER (OUTPATIENT)
Dept: GENERAL RADIOLOGY | Facility: HOSPITAL | Age: 59
Discharge: HOME | End: 2019-03-29

## 2019-03-29 ENCOUNTER — ANESTHESIA (OUTPATIENT)
Dept: PAIN MEDICINE | Facility: HOSPITAL | Age: 59
End: 2019-03-29

## 2019-03-29 ENCOUNTER — HOSPITAL ENCOUNTER (OUTPATIENT)
Dept: PAIN MEDICINE | Facility: HOSPITAL | Age: 59
Discharge: HOME | End: 2019-03-29
Admitting: ANESTHESIOLOGY

## 2019-03-29 ENCOUNTER — ANESTHESIA EVENT (OUTPATIENT)
Dept: PAIN MEDICINE | Facility: HOSPITAL | Age: 59
End: 2019-03-29

## 2019-03-29 VITALS
DIASTOLIC BLOOD PRESSURE: 82 MMHG | HEART RATE: 87 BPM | RESPIRATION RATE: 16 BRPM | SYSTOLIC BLOOD PRESSURE: 127 MMHG | OXYGEN SATURATION: 99 % | TEMPERATURE: 98.1 F

## 2019-03-29 DIAGNOSIS — R52 PAIN: ICD-10-CM

## 2019-03-29 DIAGNOSIS — M50.30 DDD (DEGENERATIVE DISC DISEASE), CERVICAL: ICD-10-CM

## 2019-03-29 DIAGNOSIS — M54.12 CERVICAL RADICULOPATHY: ICD-10-CM

## 2019-03-29 LAB — GLUCOSE BLDC GLUCOMTR-MCNC: 159 MG/DL (ref 70–130)

## 2019-03-29 PROCEDURE — 25010000002 METHYLPREDNISOLONE PER 80 MG: Performed by: ANESTHESIOLOGY

## 2019-03-29 PROCEDURE — C1755 CATHETER, INTRASPINAL: HCPCS

## 2019-03-29 PROCEDURE — 82962 GLUCOSE BLOOD TEST: CPT

## 2019-03-29 PROCEDURE — 25010000002 MIDAZOLAM PER 1 MG: Performed by: ANESTHESIOLOGY

## 2019-03-29 PROCEDURE — 77003 FLUOROGUIDE FOR SPINE INJECT: CPT

## 2019-03-29 RX ORDER — LIDOCAINE HYDROCHLORIDE 10 MG/ML
1 INJECTION, SOLUTION INFILTRATION; PERINEURAL ONCE AS NEEDED
Status: DISCONTINUED | OUTPATIENT
Start: 2019-03-29 | End: 2019-03-30 | Stop reason: HOSPADM

## 2019-03-29 RX ORDER — METHYLPREDNISOLONE ACETATE 80 MG/ML
80 INJECTION, SUSPENSION INTRA-ARTICULAR; INTRALESIONAL; INTRAMUSCULAR; SOFT TISSUE ONCE
Status: COMPLETED | OUTPATIENT
Start: 2019-03-29 | End: 2019-03-29

## 2019-03-29 RX ORDER — SODIUM CHLORIDE 0.9 % (FLUSH) 0.9 %
1-10 SYRINGE (ML) INJECTION AS NEEDED
Status: DISCONTINUED | OUTPATIENT
Start: 2019-03-29 | End: 2019-03-30 | Stop reason: HOSPADM

## 2019-03-29 RX ORDER — SODIUM CHLORIDE 0.9 % (FLUSH) 0.9 %
3-10 SYRINGE (ML) INJECTION AS NEEDED
Status: DISCONTINUED | OUTPATIENT
Start: 2019-03-29 | End: 2019-03-30 | Stop reason: HOSPADM

## 2019-03-29 RX ORDER — SODIUM CHLORIDE 0.9 % (FLUSH) 0.9 %
3 SYRINGE (ML) INJECTION EVERY 12 HOURS SCHEDULED
Status: DISCONTINUED | OUTPATIENT
Start: 2019-03-29 | End: 2019-03-30 | Stop reason: HOSPADM

## 2019-03-29 RX ORDER — FENTANYL CITRATE 50 UG/ML
50 INJECTION, SOLUTION INTRAMUSCULAR; INTRAVENOUS AS NEEDED
Status: DISCONTINUED | OUTPATIENT
Start: 2019-03-29 | End: 2019-03-30 | Stop reason: HOSPADM

## 2019-03-29 RX ORDER — MIDAZOLAM HYDROCHLORIDE 1 MG/ML
1 INJECTION INTRAMUSCULAR; INTRAVENOUS AS NEEDED
Status: DISCONTINUED | OUTPATIENT
Start: 2019-03-29 | End: 2019-03-30 | Stop reason: HOSPADM

## 2019-03-29 RX ADMIN — MIDAZOLAM 2 MG: 1 INJECTION INTRAMUSCULAR; INTRAVENOUS at 08:03

## 2019-03-29 RX ADMIN — METHYLPREDNISOLONE ACETATE 80 MG: 80 INJECTION, SUSPENSION INTRA-ARTICULAR; INTRALESIONAL; INTRAMUSCULAR; SOFT TISSUE at 08:05

## 2019-03-29 NOTE — H&P
[facility]    History and Physical    Patient Name: Amanda Bettencourt  :  1960  MRN:  [id number]  Date of Admission: 3/29/2019    Subjective     Patient is a 58 y.o. female presents with chief complaint of chronic, moderate, severe neck and arm: left pain.  Onset of symptoms was gradual starting 4 months ago.  Symptoms are associated/aggravated by exercise, sitting or standing. Symptoms improve with relaxation, pain medication and injection.  No relief w/ first SAMMY.  Presents for SAMMY 2.      The following portions of the patients history were reviewed and updated as appropriate: current medications, allergies, past medical history, past surgical history, past family history, past social history and problem list                Objective     Past Medical History:   Past Medical History:   Diagnosis Date   • Allergic rhinitis    • Anemia    • Anxiety    • Back pain    • CTS (carpal tunnel syndrome)    • Depression    • Diabetes mellitus (CMS/[facility])    • Esophageal reflux    • Esophagitis, reflux    • Fatigue    • Fracture of left ankle 2015   • Hypercholesterolemia    • Hyperlipidemia    • Hypertension    • Hypokalemia    • Hyponatremia    • IBS (irritable bowel syndrome)    • Joint pain, knee     both knees   • Osteoarthritis, knee    • Osteoarthritis, shoulder    • Plantar fasciitis    • Sleep apnea      Past Surgical History:   Past Surgical History:   Procedure Laterality Date   • ANKLE SURGERY Left 2017   •  SECTION     • COLONOSCOPY  2015    Non-bleeding internal hemorrhoids   • HYSTEROSCOPY ENDOMETRIAL ABLATION     • KNEE ARTHROSCOPY Bilateral    • KNEE SURGERY Right 02/10/2017   • REPLACEMENT TOTAL KNEE Right 2017   • TONSILLECTOMY     • TOTAL KNEE ARTHROPLASTY Bilateral    • TUBAL ABDOMINAL LIGATION       Family History:   Family History   Problem Relation Age of Onset   • Arthritis Other    • Cancer Other    • Heart disease Other    • Hyperlipidemia Other    •  Stroke Other    • Colon cancer Maternal Grandmother      Social History:   Social History     Tobacco Use   • Smoking status: Current Every Day Smoker     Packs/day: 2.00     Types: Cigarettes     Start date: 1976   • Smokeless tobacco: Never Used   • Tobacco comment: Began smoking age 19.  Although she is currently smoking 2 ppd, she smoked 1.25 ppd for about 11 years and then 2.25 ppd an average for 26 years for a 72.25 pack year history.   Substance Use Topics   • Alcohol use: Yes     Comment: social   • Drug use: No       Vital Signs Range for the last 24 hours  Temperature: Temp:  [36.7 °C (98.1 °F)] 36.7 °C (98.1 °F)   Temp Source: Temp src: Oral   BP: BP: (132)/(83) 132/83   Pulse: Heart Rate:  [92] 92   Respirations: Resp:  [16] 16   SPO2: SpO2:  [96 %] 96 %   O2 Amount (l/min):     O2 Devices Device (Oxygen Therapy): room air   Weight:           --------------------------------------------------------------------------------    Current Outpatient Medications   Medication Sig Dispense Refill   • Calcium Carbonate-Vit D-Min (CALCIUM 1200 PO) Take 1,200 mg by mouth Daily.     • cholecalciferol (VITAMIN D3) 09602 UNITS capsule Take 2,000 Units by mouth.     • ezetimibe (ZETIA) 10 MG tablet Take 1 tablet by mouth daily.     • FLUoxetine (PROzac) 20 MG capsule Take 4 capsules by mouth daily.     • gabapentin (NEURONTIN) 800 MG tablet Take 1 tablet by mouth 3 (Three) Times a Day. 270 tablet 1   • lisinopril-hydrochlorothiazide (PRINZIDE,ZESTORETIC) 20-12.5 MG per tablet Take 1 tablet by mouth 2 (two) times a day.     • metoclopramide (REGLAN) 10 MG tablet Take 1 tablet by mouth 3 (Three) Times a Day. 270 tablet 3   • Multiple Vitamins-Minerals (MULTIVITAMIN ADULT PO) Take  by mouth.     • pantoprazole (PROTONIX) 40 MG EC tablet TAKE 1 TABLET EVERY MORNING BEFORE BREAKFAST 90 tablet 1   • rosuvastatin (CRESTOR) 40 MG tablet Take 40 mg by mouth Daily.     • SitaGLIPtin-MetFORMIN HCl -1000 MG tablet  sustained-release 24 hour Take by mouth.     • aspirin 81 MG tablet Take by mouth daily.       Current Facility-Administered Medications   Medication Dose Route Frequency Provider Last Rate Last Dose   • sodium chloride 0.9 % flush 3 mL  3 mL Intravenous Q12H Fiona Del Angel MD       • sodium chloride 0.9 % flush 3-10 mL  3-10 mL Intravenous PRN Fiona Del Angel MD           --------------------------------------------------------------------------------  Assessment/Plan      Anesthesia Evaluation     Patient summary reviewed and Nursing notes reviewed                Airway   Mallampati: II  TM distance: >3 FB  Dental - normal exam     Pulmonary - normal exam   (+) a smoker Current, sleep apnea,   Cardiovascular - normal exam  Exercise tolerance: good (4-7 METS)    Rhythm: regular    (+) hypertension, hyperlipidemia,       Neuro/Psych- neuro exam normal  (+) numbness, psychiatric history,     GI/Hepatic/Renal/Endo    (+)  GERD,  diabetes mellitus,     Musculoskeletal (-) normal exam    (+) back pain, neck pain,   Abdominal  - normal exam   Substance History - negative use     OB/GYN negative ob/gyn ROS         Other   (+) arthritis                Diagnosis and Plan    Treatment Plan  ASA 3   Patient has had previous injection/procedure with 0% improvement.   Procedures: Cervical Epidural Steroid Injection(SAMMY), With fluoroscopy,       Anesthetic plan and risks discussed with patient.          Diagnosis     * DDD (degenerative disc disease), cervical [M50.30]     * Cervical radiculopathy [M54.12]

## 2019-03-29 NOTE — ANESTHESIA PROCEDURE NOTES
PAIN Epidural block    Pre-sedation assessment completed: 3/29/2019 7:57 AM    Patient reassessed immediately prior to procedure    Patient location during procedure: pain clinic  Start Time: 3/29/2019 7:59 AM  Stop Time: 3/29/2019 8:07 AM  Indication:procedure for pain  Performed By  Anesthesiologist: Fiona Del Angel MD  Preanesthetic Checklist  Completed: patient identified, site marked, surgical consent, pre-op evaluation, timeout performed, IV checked, risks and benefits discussed and monitors and equipment checked  Additional Notes  Cervical degenerative disc dz, radiculopathy  Fluoro used.    Prep:  Pt Position:prone  Sterile Tech:cap, gloves, mask and sterile barrier  Prep:chlorhexidine gluconate and isopropyl alcohol  Monitoring:blood pressure monitoring, continuous pulse oximetry and EKG  Procedure:Sedation: yes     Approach:midline  Guidance: fluoroscopy  Location:cervical  Level:6-7  Needle Type:Tuohy  Needle Gauge:20  Aspiration:negative  Medications:  Depomedrol:80  Preservative Free Saline:3mL    Post Assessment:  Dressing:occlusive dressing applied  Pt Tolerance:patient tolerated the procedure well with no apparent complications  Complications:no

## 2019-04-22 ENCOUNTER — HOSPITAL ENCOUNTER (OUTPATIENT)
Dept: GENERAL RADIOLOGY | Facility: HOSPITAL | Age: 59
Discharge: HOME | End: 2019-04-22

## 2019-04-22 ENCOUNTER — ANESTHESIA (OUTPATIENT)
Dept: PAIN MEDICINE | Facility: HOSPITAL | Age: 59
End: 2019-04-22

## 2019-04-22 ENCOUNTER — HOSPITAL ENCOUNTER (OUTPATIENT)
Dept: PAIN MEDICINE | Facility: HOSPITAL | Age: 59
Discharge: HOME | End: 2019-04-22
Admitting: ANESTHESIOLOGY

## 2019-04-22 ENCOUNTER — ANESTHESIA EVENT (OUTPATIENT)
Dept: PAIN MEDICINE | Facility: HOSPITAL | Age: 59
End: 2019-04-22

## 2019-04-22 VITALS
DIASTOLIC BLOOD PRESSURE: 74 MMHG | OXYGEN SATURATION: 97 % | RESPIRATION RATE: 16 BRPM | SYSTOLIC BLOOD PRESSURE: 129 MMHG | HEART RATE: 75 BPM | TEMPERATURE: 98.1 F

## 2019-04-22 DIAGNOSIS — R52 PAIN: ICD-10-CM

## 2019-04-22 DIAGNOSIS — M50.30 DDD (DEGENERATIVE DISC DISEASE), CERVICAL: ICD-10-CM

## 2019-04-22 DIAGNOSIS — M54.12 CERVICAL RADICULOPATHY: ICD-10-CM

## 2019-04-22 LAB — GLUCOSE BLDC GLUCOMTR-MCNC: 157 MG/DL (ref 70–130)

## 2019-04-22 PROCEDURE — 0 IOPAMIDOL 41 % SOLUTION: Performed by: ANESTHESIOLOGY

## 2019-04-22 PROCEDURE — 25010000002 MIDAZOLAM PER 1 MG: Performed by: ANESTHESIOLOGY

## 2019-04-22 PROCEDURE — C1755 CATHETER, INTRASPINAL: HCPCS

## 2019-04-22 PROCEDURE — 77003 FLUOROGUIDE FOR SPINE INJECT: CPT

## 2019-04-22 PROCEDURE — 25010000002 DEXAMETHASONE SODIUM PHOSPHATE 10 MG/ML SOLUTION: Performed by: ANESTHESIOLOGY

## 2019-04-22 PROCEDURE — 82962 GLUCOSE BLOOD TEST: CPT

## 2019-04-22 RX ORDER — MIDAZOLAM HYDROCHLORIDE 1 MG/ML
1 INJECTION INTRAMUSCULAR; INTRAVENOUS AS NEEDED
Status: DISCONTINUED | OUTPATIENT
Start: 2019-04-22 | End: 2019-04-23 | Stop reason: HOSPADM

## 2019-04-22 RX ORDER — SODIUM CHLORIDE 0.9 % (FLUSH) 0.9 %
3 SYRINGE (ML) INJECTION EVERY 12 HOURS SCHEDULED
Status: DISCONTINUED | OUTPATIENT
Start: 2019-04-22 | End: 2019-04-23 | Stop reason: HOSPADM

## 2019-04-22 RX ORDER — DEXAMETHASONE SODIUM PHOSPHATE 10 MG/ML
10 INJECTION, SOLUTION INTRAMUSCULAR; INTRAVENOUS ONCE
Status: COMPLETED | OUTPATIENT
Start: 2019-04-22 | End: 2019-04-22

## 2019-04-22 RX ORDER — FENTANYL CITRATE 50 UG/ML
50 INJECTION, SOLUTION INTRAMUSCULAR; INTRAVENOUS AS NEEDED
Status: DISCONTINUED | OUTPATIENT
Start: 2019-04-22 | End: 2019-04-23 | Stop reason: HOSPADM

## 2019-04-22 RX ORDER — SODIUM CHLORIDE 0.9 % (FLUSH) 0.9 %
3-10 SYRINGE (ML) INJECTION AS NEEDED
Status: DISCONTINUED | OUTPATIENT
Start: 2019-04-22 | End: 2019-04-23 | Stop reason: HOSPADM

## 2019-04-22 RX ORDER — LIDOCAINE HYDROCHLORIDE 10 MG/ML
1 INJECTION, SOLUTION INFILTRATION; PERINEURAL ONCE AS NEEDED
Status: DISCONTINUED | OUTPATIENT
Start: 2019-04-22 | End: 2019-04-23 | Stop reason: HOSPADM

## 2019-04-22 RX ORDER — SODIUM CHLORIDE 0.9 % (FLUSH) 0.9 %
1-10 SYRINGE (ML) INJECTION AS NEEDED
Status: DISCONTINUED | OUTPATIENT
Start: 2019-04-22 | End: 2019-04-23 | Stop reason: HOSPADM

## 2019-04-22 RX ADMIN — IOPAMIDOL 10 ML: 408 INJECTION, SOLUTION INTRATHECAL at 08:03

## 2019-04-22 RX ADMIN — DEXAMETHASONE SODIUM PHOSPHATE 10 MG: 10 INJECTION, SOLUTION INTRAMUSCULAR; INTRAVENOUS at 08:04

## 2019-04-22 RX ADMIN — MIDAZOLAM 2 MG: 1 INJECTION INTRAMUSCULAR; INTRAVENOUS at 07:54

## 2019-04-22 NOTE — INTERVAL H&P NOTE
[facility]  H&P reviewed. No changes since last visit.  Patient states   50-75% improvement since the last procedure/injection.    Diagnosis     * Degeneration of cervical intervertebral disc [M50.30]     * Cervical radiculopathy [M54.12]      Airway assessed since last visit. Airway class equals: 2.  Pain originates in her cervical spine and radiates to her left upper extremity.  Today she rates her pain is a 3/10.  She is here for cervical epidural steroid injection #2.

## 2019-04-22 NOTE — ANESTHESIA PROCEDURE NOTES
PAIN Epidural block      Patient reassessed immediately prior to procedure    Patient location during procedure: pain clinic  Start Time: 4/22/2019 7:51 AM  Stop Time: 4/22/2019 8:06 AM  Indication:procedure for pain  Performed By  Anesthesiologist: Jake Orellana MD  Preanesthetic Checklist  Completed: patient identified, site marked, surgical consent, pre-op evaluation, timeout performed, risks and benefits discussed and monitors and equipment checked  Additional Notes  Post-Op Diagnosis Codes:     * Degeneration of cervical intervertebral disc (M50.30)     * Cervical radiculopathy (M54.12)  Prep:  Pt Position:prone  Sterile Tech:cap, gloves, mask and sterile barrier  Prep:chlorhexidine gluconate and isopropyl alcohol  Monitoring:blood pressure monitoring, continuous pulse oximetry and EKG  Procedure:Sedation: yes     Approach:midline  Guidance: fluoroscopy  Location:cervical  Level:6-7 (Interlaminar)  Needle Type:Tuohy  Needle Gauge:20 G  Aspiration:negative  Medications:  Preservative Free Saline:3mL  Isovue:2mL  Comments:Epidural spread of contrast.  Preservative free dexamethasone 10 mg  Post Assessment:  Dressing:occlusive dressing applied  Pt Tolerance:patient tolerated the procedure well with no apparent complications  Complications:no

## 2019-05-02 ENCOUNTER — OFFICE VISIT (OUTPATIENT)
Dept: GASTROENTEROLOGY | Facility: CLINIC | Age: 59
End: 2019-05-02

## 2019-05-02 VITALS
DIASTOLIC BLOOD PRESSURE: 76 MMHG | BODY MASS INDEX: 35.52 KG/M2 | WEIGHT: 221 LBS | HEIGHT: 66 IN | TEMPERATURE: 99 F | SYSTOLIC BLOOD PRESSURE: 130 MMHG

## 2019-05-02 DIAGNOSIS — K21.9 GASTROESOPHAGEAL REFLUX DISEASE, ESOPHAGITIS PRESENCE NOT SPECIFIED: ICD-10-CM

## 2019-05-02 DIAGNOSIS — K21.9 GASTROESOPHAGEAL REFLUX DISEASE WITHOUT ESOPHAGITIS: Primary | ICD-10-CM

## 2019-05-02 DIAGNOSIS — K31.84 GASTROPARESIS: ICD-10-CM

## 2019-05-02 PROCEDURE — 99212 OFFICE O/P EST SF 10 MIN: CPT | Performed by: INTERNAL MEDICINE

## 2019-05-02 RX ORDER — METOCLOPRAMIDE 10 MG/1
10 TABLET ORAL 3 TIMES DAILY
Qty: 270 TABLET | Refills: 3 | Status: SHIPPED | OUTPATIENT
Start: 2019-05-02 | End: 2020-06-25

## 2019-05-02 RX ORDER — PANTOPRAZOLE SODIUM 40 MG/1
40 TABLET, DELAYED RELEASE ORAL
Qty: 90 TABLET | Refills: 3 | Status: SHIPPED | OUTPATIENT
Start: 2019-05-02 | End: 2020-04-27

## 2019-05-02 RX ORDER — HYDROCODONE BITARTRATE AND ACETAMINOPHEN 5; 325 MG/1; MG/1
TABLET ORAL
Refills: 0 | COMMUNITY
Start: 2019-04-30 | End: 2021-05-04

## 2019-05-02 NOTE — PROGRESS NOTES
Chief Complaint   Patient presents with   • Heartburn       Amanda Bettencourt is a  58 y.o. female here for a follow up visit for GERD and gastroparesis.    HPI     Patient 58-year-old female with history of diabetes, hypertension, hyperlipidemia and IBS along with gastroparesis and GERD reports doing very well from GI point of view.  Patient reports tolerating diet without issue denies any heartburn no change in bowel habits no nausea vomiting no melena or bright red blood per rectum.  Patient here for medication refill.    Past Medical History:   Diagnosis Date   • Allergic rhinitis    • Anemia    • Anxiety    • Back pain    • CTS (carpal tunnel syndrome)    • Depression    • Diabetes mellitus (CMS/HCC)    • Esophageal reflux    • Esophagitis, reflux    • Fatigue    • Fracture of left ankle 12/24/2015   • Hypercholesterolemia    • Hyperlipidemia    • Hypertension    • Hypokalemia    • Hyponatremia    • IBS (irritable bowel syndrome)    • Joint pain, knee     both knees   • Osteoarthritis, knee    • Osteoarthritis, shoulder    • Plantar fasciitis    • Sleep apnea          Current Outpatient Medications:   •  aspirin 81 MG tablet, Take by mouth daily., Disp: , Rfl:   •  Calcium Carbonate-Vit D-Min (CALCIUM 1200 PO), Take 1,200 mg by mouth Daily., Disp: , Rfl:   •  cholecalciferol (VITAMIN D3) 63759 UNITS capsule, Take 2,000 Units by mouth., Disp: , Rfl:   •  ezetimibe (ZETIA) 10 MG tablet, Take 1 tablet by mouth daily., Disp: , Rfl:   •  FLUoxetine (PROzac) 20 MG capsule, Take 4 capsules by mouth daily., Disp: , Rfl:   •  gabapentin (NEURONTIN) 800 MG tablet, Take 1 tablet by mouth 3 (Three) Times a Day., Disp: 270 tablet, Rfl: 1  •  HYDROcodone-acetaminophen (NORCO) 5-325 MG per tablet, TAKE 1 OR 2 TABLETS BY MOUTH EVERY SIX HOURS AS NEEDED, Disp: , Rfl: 0  •  lisinopril-hydrochlorothiazide (PRINZIDE,ZESTORETIC) 20-12.5 MG per tablet, Take 1 tablet by mouth 2 (two) times a day., Disp: , Rfl:   •  metoclopramide  (REGLAN) 10 MG tablet, Take 1 tablet by mouth 3 (Three) Times a Day., Disp: 270 tablet, Rfl: 3  •  Multiple Vitamins-Minerals (MULTIVITAMIN ADULT PO), Take  by mouth., Disp: , Rfl:   •  pantoprazole (PROTONIX) 40 MG EC tablet, Take 1 tablet by mouth Every Morning Before Breakfast., Disp: 90 tablet, Rfl: 3  •  rosuvastatin (CRESTOR) 40 MG tablet, Take 40 mg by mouth Daily., Disp: , Rfl:   •  SitaGLIPtin-MetFORMIN HCl -1000 MG tablet sustained-release 24 hour, Take by mouth., Disp: , Rfl:     No Known Allergies    Social History     Socioeconomic History   • Marital status:      Spouse name: Not on file   • Number of children: Not on file   • Years of education: Not on file   • Highest education level: Not on file   Tobacco Use   • Smoking status: Current Every Day Smoker     Packs/day: 2.00     Types: Cigarettes     Start date: 1976   • Smokeless tobacco: Never Used   • Tobacco comment: Began smoking age 19.  Although she is currently smoking 2 ppd, she smoked 1.25 ppd for about 11 years and then 2.25 ppd an average for 26 years for a 72.25 pack year history.   Substance and Sexual Activity   • Alcohol use: Yes     Comment: social   • Drug use: No   • Sexual activity: Defer       Family History   Problem Relation Age of Onset   • Arthritis Other    • Cancer Other    • Heart disease Other    • Hyperlipidemia Other    • Stroke Other    • Colon cancer Maternal Grandmother        Review of Systems   Constitutional: Negative.    Respiratory: Negative.    Cardiovascular: Negative.    Gastrointestinal: Negative.    Skin: Negative.    Hematological: Negative.        Vitals:    05/02/19 0850   BP: 130/76   Temp: 99 °F (37.2 °C)       Physical Exam   Constitutional: She is oriented to person, place, and time. She appears well-developed and well-nourished.   HENT:   Head: Normocephalic and atraumatic.   Eyes: Pupils are equal, round, and reactive to light. No scleral icterus.   Cardiovascular: Normal rate and  regular rhythm.   Pulmonary/Chest: Effort normal.   Abdominal: Soft. Bowel sounds are normal. She exhibits no distension and no mass. There is no tenderness. No hernia.   Neurological: She is alert and oriented to person, place, and time.   Skin: Skin is warm and dry. No rash noted.   Psychiatric: She has a normal mood and affect. Her behavior is normal.   Vitals reviewed.      Hospital Outpatient Visit on 04/22/2019   Component Date Value Ref Range Status   • Glucose 04/22/2019 157* 70 - 130 mg/dL Final   Hospital Outpatient Visit on 03/29/2019   Component Date Value Ref Range Status   • Glucose 03/29/2019 159* 70 - 130 mg/dL Final       Amanda was seen today for heartburn.    Diagnoses and all orders for this visit:    Gastroesophageal reflux disease without esophagitis    Gastroparesis  -     metoclopramide (REGLAN) 10 MG tablet; Take 1 tablet by mouth 3 (Three) Times a Day.    Gastroesophageal reflux disease, esophagitis presence not specified  -     pantoprazole (PROTONIX) 40 MG EC tablet; Take 1 tablet by mouth Every Morning Before Breakfast.      Patient 58-year-old female with history of GERD and gastroparesis here for med refill.  Patient reports since December has been doing well without events.  Patient tolerating her medications without side effects.  Patient denies any nausea vomiting no fever or chills no melena or bright red blood per rectum.  Patient reports appetite good.  Will continue current medication and follow-up in 1 year.

## 2019-06-21 ENCOUNTER — TELEPHONE (OUTPATIENT)
Dept: NEUROSURGERY | Facility: CLINIC | Age: 59
End: 2019-06-21

## 2019-06-21 NOTE — TELEPHONE ENCOUNTER
I called and LVM for patient regarding their new patient packet that was mailed out on 06/12. We have not received their packet and if we do not receive it by 07/05/19 by 5 pm, we will have to cancel their appointment. We ask that all new patient packets be back within 3 business days of their scheduled appointment.

## 2019-07-02 NOTE — PROGRESS NOTES
Subjective   Patient ID: Aamnda Bettencourt is a 59 y.o. female is being seen for consultation today at the request of Alesha Chacko MD for neck pain that radiated into the left shoulder, but now states the pain is just in the left side of the neck . Patient is here today with a C-spine MRI. Patient has tried a series of 3 Cervical epidurals. Her last C-REEMA was on 04/22/19. Patient states that she received less than 50% relief. Patient is taking Neurontin 800 mg TID and Norco 5-325 q6hrs PRN pain.    Neck Pain    This is a recurrent problem. The current episode started more than 1 month ago (November 2018). The problem occurs intermittently. The problem has been gradually worsening. The pain is associated with nothing. The pain is present in the left side. The quality of the pain is described as aching, burning, cramping, shooting and stabbing. The pain is moderate. The symptoms are aggravated by position. Associated symptoms include headaches. Pertinent negatives include no chest pain, numbness, tingling, visual change or weakness. She has tried NSAIDs and oral narcotics (C-REEMA series, pain medication) for the symptoms. The treatment provided mild relief.       The following portions of the patient's history were reviewed and updated as appropriate: allergies, current medications, past family history, past medical history, past social history, past surgical history and problem list.    Review of Systems   Constitutional: Positive for activity change and fatigue.   HENT: Positive for congestion, postnasal drip and sneezing.    Respiratory: Positive for cough. Negative for chest tightness and shortness of breath.    Cardiovascular: Negative for chest pain.   Gastrointestinal: Negative.    Musculoskeletal: Positive for neck pain and neck stiffness.   Allergic/Immunologic: Positive for environmental allergies.   Neurological: Positive for headaches. Negative for tingling, weakness and numbness.    Psychiatric/Behavioral: Positive for agitation, dysphoric mood and sleep disturbance. The patient is nervous/anxious.    All other systems reviewed and are negative.      Objective   Physical Exam   Constitutional: She is oriented to person, place, and time. She appears well-developed and well-nourished. No distress.   HENT:   Head: Normocephalic.   Eyes: Pupils are equal, round, and reactive to light.   Neck:   ROM normal except turning head to the left creates left trapezius and neck pain   Cardiovascular: Normal rate.   Pulmonary/Chest: Effort normal.   Abdominal: Soft.   Musculoskeletal: She exhibits no edema.   Neurological: She is alert and oriented to person, place, and time. No cranial nerve deficit. Gait normal.   Reflex Scores:       Achilles reflexes are 0 on the right side and 0 on the left side.  Skin: Skin is warm and dry.   Psychiatric: She has a normal mood and affect.   Vitals reviewed.    Neurologic Exam     Mental Status   Oriented to person, place, and time.     Cranial Nerves     CN III, IV, VI   Pupils are equal, round, and reactive to light.    Motor Exam   Muscle bulk: normal  Overall muscle tone: normal    Sensory Exam   Light touch normal.     Gait, Coordination, and Reflexes     Gait  Gait: normal    Reflexes   Reflexes 2+ except as noted.   Right achilles: 0  Left achilles: 0  Right Alexandre: absent  Left Alexandre: absent  Right ankle clonus: absent  Left ankle clonus: absent      Assessment/Plan   Independent Review of Radiographic Studies:    The MRI done at [facility] 6/4/19 of the cervical spine was reviewed as well as the report and reveals moderate to severe central stenosis due to discogenic and facet changes, worse at C6-C7 followed by C5C6 with left greater than right foraminal narrowing at C6C7      Medical Decision Making:      The MRI does reveal fairly significant stenosis at C5C6 and C6C7, however she is not having arm pain at this point. It was discussed with her that  surgery is designed to relieve the radicular pain in the arm and really does not change the neck pain. She has had epidurals only with [facility], but with the neck pain she describes, she would benefit from a more comprehensive pain management evaluation so we will refer her to Dr Bains. She understands that since she had epidurals within the past three months, she may not be eligible for injectable options for a few more months, but she would like to establish that relationship now. We discussed trying physical therapy, but she says she went to one session earlier this year, but it aggravated the neck pain. We discussed the possibility of trying just their pain relieving techniques, but she declines.  We discussed in detail that if she develops arm pain, weakness or numbness, she is to call for an appointment. She would like a follow up visit in three months rather than being seen on an as needed basis, so we will see her back. She was advised to bring her MRI disc again when she returns.     Amanda was seen today for neck pain and shoulder pain.    Diagnoses and all orders for this visit:    Spinal stenosis in cervical region  -     Ambulatory Referral to Pain Management    Chronic pain syndrome    Cervical radiculopathy      Return in about 3 months (around 10/11/2019).

## 2019-07-11 ENCOUNTER — OFFICE VISIT (OUTPATIENT)
Dept: NEUROSURGERY | Facility: CLINIC | Age: 59
End: 2019-07-11

## 2019-07-11 VITALS
HEART RATE: 96 BPM | SYSTOLIC BLOOD PRESSURE: 96 MMHG | WEIGHT: 221 LBS | DIASTOLIC BLOOD PRESSURE: 67 MMHG | HEIGHT: 66 IN | BODY MASS INDEX: 35.52 KG/M2

## 2019-07-11 DIAGNOSIS — M54.12 CERVICAL RADICULOPATHY: ICD-10-CM

## 2019-07-11 DIAGNOSIS — M48.02 SPINAL STENOSIS IN CERVICAL REGION: Primary | ICD-10-CM

## 2019-07-11 DIAGNOSIS — G89.4 CHRONIC PAIN SYNDROME: ICD-10-CM

## 2019-07-11 PROCEDURE — 99243 OFF/OP CNSLTJ NEW/EST LOW 30: CPT | Performed by: NURSE PRACTITIONER

## 2019-11-21 NOTE — TELEPHONE ENCOUNTER
----- Message from PAPA Rudolph sent at 5/30/2018  2:38 PM EDT -----  Regarding: random  Random UDS and Pill COunt         6/11/18 @ 9:33 am Spoke to patient and she states she will try to be here by 4 pm, states she has several things going on today but she will try.    AUGUST    6/12/18 @ 8:04 am LM for patient to come in today for UDS/Pill count.    AUGUST    6/12/18 @ 2:07 pm LM for patient    AUGUST    6/13/18 @ 10:05 am LM for patient    AUGUST     
Tried to reach patient several times as documented below for UDS/Pill count and she has not came into the office yet  
Will discuss at next OV. 
Digestive

## 2020-04-26 DIAGNOSIS — K21.9 GASTROESOPHAGEAL REFLUX DISEASE, ESOPHAGITIS PRESENCE NOT SPECIFIED: ICD-10-CM

## 2020-04-27 RX ORDER — PANTOPRAZOLE SODIUM 40 MG/1
TABLET, DELAYED RELEASE ORAL
Qty: 90 TABLET | Refills: 0 | Status: SHIPPED | OUTPATIENT
Start: 2020-04-27 | End: 2021-03-08 | Stop reason: SDUPTHER

## 2020-06-22 DIAGNOSIS — K31.84 GASTROPARESIS: ICD-10-CM

## 2020-06-25 RX ORDER — METOCLOPRAMIDE 10 MG/1
TABLET ORAL
Qty: 270 TABLET | Refills: 3 | Status: SHIPPED | OUTPATIENT
Start: 2020-06-25 | End: 2021-10-08

## 2020-07-06 ENCOUNTER — TELEPHONE (OUTPATIENT)
Dept: GASTROENTEROLOGY | Facility: CLINIC | Age: 60
End: 2020-07-06

## 2020-07-06 RX ORDER — METOCLOPRAMIDE 10 MG/1
10 TABLET ORAL 3 TIMES DAILY
Qty: 90 TABLET | Refills: 0 | Status: SHIPPED | OUTPATIENT
Start: 2020-07-06 | End: 2022-10-20 | Stop reason: SDUPTHER

## 2020-07-06 NOTE — TELEPHONE ENCOUNTER
----- Message from Lorraine Orozco sent at 7/6/2020  9:50 AM EDT -----  Contact: [phone]  Patient has questions regarding medication.  Please call

## 2020-07-06 NOTE — TELEPHONE ENCOUNTER
Called pt back. She states [facility] is holding her medication because they have questions on it. She is very frustrated and needs the medication; she is down to her last pill. Advised will send the med to her local pharmacy until we can straighten out her mail order script. Pt verb understanding.

## 2020-07-25 DIAGNOSIS — K21.9 GASTROESOPHAGEAL REFLUX DISEASE, ESOPHAGITIS PRESENCE NOT SPECIFIED: ICD-10-CM

## 2020-07-28 RX ORDER — PANTOPRAZOLE SODIUM 40 MG/1
TABLET, DELAYED RELEASE ORAL
Qty: 90 TABLET | Refills: 3 | OUTPATIENT
Start: 2020-07-28

## 2020-09-22 ENCOUNTER — OFFICE VISIT (OUTPATIENT)
Dept: GASTROENTEROLOGY | Facility: CLINIC | Age: 60
End: 2020-09-22

## 2020-09-22 VITALS
TEMPERATURE: 97.5 F | DIASTOLIC BLOOD PRESSURE: 90 MMHG | WEIGHT: 212.2 LBS | HEIGHT: 66 IN | SYSTOLIC BLOOD PRESSURE: 140 MMHG | BODY MASS INDEX: 34.1 KG/M2

## 2020-09-22 DIAGNOSIS — K31.84 GASTROPARESIS: ICD-10-CM

## 2020-09-22 DIAGNOSIS — R13.19 ESOPHAGEAL DYSPHAGIA: Primary | ICD-10-CM

## 2020-09-22 DIAGNOSIS — K21.9 GASTROESOPHAGEAL REFLUX DISEASE, ESOPHAGITIS PRESENCE NOT SPECIFIED: ICD-10-CM

## 2020-09-22 PROCEDURE — 99214 OFFICE O/P EST MOD 30 MIN: CPT | Performed by: NURSE PRACTITIONER

## 2020-09-22 NOTE — PROGRESS NOTES
Chief Complaint   Patient presents with   • Difficulty Swallowing     HPI    Amanda Bettencourt is a  60 y.o. female here for a follow up visit for dysphagia.  Patient has a past GI medical history of GERD and gastroparesis.  This patient follows Dr. Figueredo, new to me.  She has a past medical history of diabetes, hypertension, and hyperlipidemia along with irritable bowel syndrome.    Her primary complaint today is esophageal dysphasia mainly with pills and dense breads ongoing for the past several months.  Patient drinks water for relief.  Denies regurgitation.  No nausea or vomiting.  She is on Protonix 40 mg once daily and feels like acid reflux is well controlled.    She continues on gastroparesis diet and Reglan 10 mg twice daily to manage gastroparesis symptoms.    No diarrhea, constipation, or rectal bleeding.    Her last colonoscopy was  with normal ileum and internal hemorrhoids, recall 10 years.    Past Medical History:   Diagnosis Date   • Allergic rhinitis    • Anemia    • Anxiety    • Back pain    • CTS (carpal tunnel syndrome)    • Depression    • Diabetes mellitus (CMS/HCC)    • Esophageal reflux    • Esophagitis, reflux    • Fatigue    • Fracture of left ankle 2015   • Hypercholesterolemia    • Hyperlipidemia    • Hypertension    • Hypokalemia    • Hyponatremia    • IBS (irritable bowel syndrome)    • Joint pain, knee     both knees   • Osteoarthritis, knee    • Osteoarthritis, shoulder    • Plantar fasciitis    • Sleep apnea        Past Surgical History:   Procedure Laterality Date   • ANKLE SURGERY Left 2017   •  SECTION     • COLONOSCOPY  2015    Non-bleeding internal hemorrhoids   • HYSTEROSCOPY ENDOMETRIAL ABLATION     • KNEE ARTHROSCOPY Bilateral    • KNEE SURGERY Right 02/10/2017   • REPLACEMENT TOTAL KNEE Right 2017   • TONSILLECTOMY     • TOTAL KNEE ARTHROPLASTY Bilateral    • TUBAL ABDOMINAL LIGATION         Scheduled Meds:  Outpatient Encounter  Medications as of 9/22/2020   Medication Sig Dispense Refill   • aspirin 81 MG tablet Take by mouth daily.     • Calcium Carbonate-Vit D-Min (CALCIUM 1200 PO) Take 1,200 mg by mouth Daily.     • cholecalciferol (VITAMIN D3) 65618 UNITS capsule Take 2,000 Units by mouth.     • ezetimibe (ZETIA) 10 MG tablet Take 1 tablet by mouth daily.     • FLUoxetine (PROzac) 20 MG capsule Take 4 capsules by mouth daily.     • gabapentin (NEURONTIN) 800 MG tablet Take 1 tablet by mouth 3 (Three) Times a Day. 270 tablet 1   • HYDROcodone-acetaminophen (NORCO) 5-325 MG per tablet TAKE 1 OR 2 TABLETS BY MOUTH EVERY SIX HOURS AS NEEDED  0   • lisinopril-hydrochlorothiazide (PRINZIDE,ZESTORETIC) 20-12.5 MG per tablet Take 1 tablet by mouth 2 (two) times a day.     • metoclopramide (REGLAN) 10 MG tablet TAKE 1 TABLET THREE TIMES A  tablet 3   • Multiple Vitamins-Minerals (MULTIVITAMIN ADULT PO) Take  by mouth.     • ondansetron (ZOFRAN) 8 MG tablet Take 8 mg by mouth 3 (Three) Times a Day As Needed.  2   • pantoprazole (PROTONIX) 40 MG EC tablet TAKE 1 TABLET EVERY MORNING BEFORE BREAKFAST 90 tablet 0   • rosuvastatin (CRESTOR) 40 MG tablet Take 40 mg by mouth Daily.     • SitaGLIPtin-MetFORMIN HCl -1000 MG tablet sustained-release 24 hour Take by mouth.     • VENTOLIN  (90 Base) MCG/ACT inhaler INHALE 2 PUFFS BY MOUTH EVERY SIX HOURS AS NEEDED  2   • acetaminophen-codeine (TYLENOL #3) 300-30 MG per tablet TAKE 2 TABLETS BY MOUTH EVERY FOUR TO SIX HOURS AS NEEDED  0   • metoclopramide (REGLAN) 10 MG tablet Take 1 tablet by mouth 3 (Three) Times a Day. 90 tablet 0     No facility-administered encounter medications on file as of 9/22/2020.        Continuous Infusions:No current facility-administered medications for this visit.       PRN Meds:.    No Known Allergies    Social History     Socioeconomic History   • Marital status:      Spouse name: Not on file   • Number of children: 2   • Years of education: 12    • Highest education level: High school graduate   Occupational History   • Occupation: UNEMPLOYED   Social Needs   • Financial resource strain: Patient refused   • Food insecurity     Worry: Patient refused     Inability: Patient refused   • Transportation needs     Medical: Patient refused     Non-medical: Patient refused   Tobacco Use   • Smoking status: Current Every Day Smoker     Packs/day: 2.00     Types: Cigarettes     Start date: 1976   • Smokeless tobacco: Never Used   • Tobacco comment: Began smoking age 19.  Although she is currently smoking 2 ppd, she smoked 1.25 ppd for about 11 years and then 2.25 ppd an average for 26 years for a 72.25 pack year history.   Substance and Sexual Activity   • Alcohol use: Yes     Comment: social   • Drug use: No   • Sexual activity: Defer   Social History Narrative    LIVES WITH        Family History   Problem Relation Age of Onset   • Arthritis Other    • Cancer Other    • Heart disease Other    • Hyperlipidemia Other    • Stroke Other    • Colon cancer Maternal Grandmother        Review of Systems   Constitutional: Negative for activity change, appetite change, fatigue, fever and unexpected weight change.   HENT: Positive for trouble swallowing.    Respiratory: Negative for apnea, cough, choking, chest tightness, shortness of breath and wheezing.    Cardiovascular: Negative for chest pain, palpitations and leg swelling.   Gastrointestinal: Negative for abdominal distention, abdominal pain, anal bleeding, blood in stool, constipation, diarrhea, nausea, rectal pain and vomiting.       Vitals:    09/22/20 1320   BP: 140/90   Temp: 97.5 °F (36.4 °C)       Physical Exam  Constitutional:       Appearance: She is well-developed.   Cardiovascular:      Rate and Rhythm: Normal rate and regular rhythm.      Heart sounds: Normal heart sounds.   Pulmonary:      Effort: Pulmonary effort is normal. No respiratory distress.      Breath sounds: Normal breath sounds. No  wheezing.   Abdominal:      General: Bowel sounds are normal. There is no distension.      Palpations: Abdomen is soft. There is no mass.      Tenderness: There is no abdominal tenderness. There is no guarding.      Hernia: No hernia is present.   Skin:     General: Skin is warm and dry.      Capillary Refill: Capillary refill takes less than 2 seconds.   Neurological:      Mental Status: She is alert and oriented to person, place, and time.   Psychiatric:         Behavior: Behavior normal.       No radiology results for the last 7 days    Amanda was seen today for difficulty swallowing.    Diagnoses and all orders for this visit:    Esophageal dysphagia  -     FL Esophagram Complete Double-Contrast; Future    Gastroparesis    Gastroesophageal reflux disease, esophagitis presence not specified    Assessment/plan    Pleasant 60-year-old female seen today in follow-up with a primary complaint of esophageal dysphagia.  Recommend esophagram to assess for stricture, ring, narrowing, rule out Zenker diverticulum.  Based on esophagram findings will move to EGD with Dr. Figueredo.   Continue PPI for now.  Stable gastroparesis, continue Reglan.  Continue gastroparesis diet.  Colonoscopy for screening purposes 2025.  Further recommendations pending esophagram.

## 2020-09-23 ENCOUNTER — TRANSCRIBE ORDERS (OUTPATIENT)
Dept: ADMINISTRATIVE | Facility: HOSPITAL | Age: 60
End: 2020-09-23

## 2020-09-23 DIAGNOSIS — Z01.818 OTHER SPECIFIED PRE-OPERATIVE EXAMINATION: Primary | ICD-10-CM

## 2020-10-02 ENCOUNTER — APPOINTMENT (OUTPATIENT)
Dept: LAB | Facility: HOSPITAL | Age: 60
End: 2020-10-02

## 2020-10-05 ENCOUNTER — LAB (OUTPATIENT)
Dept: LAB | Facility: HOSPITAL | Age: 60
End: 2020-10-05

## 2020-10-05 ENCOUNTER — APPOINTMENT (OUTPATIENT)
Dept: GENERAL RADIOLOGY | Facility: HOSPITAL | Age: 60
End: 2020-10-05

## 2020-10-05 DIAGNOSIS — Z01.818 OTHER SPECIFIED PRE-OPERATIVE EXAMINATION: ICD-10-CM

## 2020-10-05 PROCEDURE — U0004 COV-19 TEST NON-CDC HGH THRU: HCPCS

## 2020-10-05 PROCEDURE — C9803 HOPD COVID-19 SPEC COLLECT: HCPCS

## 2020-10-06 LAB — SARS-COV-2 RNA RESP QL NAA+PROBE: NOT DETECTED

## 2020-10-07 ENCOUNTER — APPOINTMENT (OUTPATIENT)
Dept: GENERAL RADIOLOGY | Facility: HOSPITAL | Age: 60
End: 2020-10-07

## 2020-10-07 ENCOUNTER — TRANSCRIBE ORDERS (OUTPATIENT)
Dept: ADMINISTRATIVE | Facility: HOSPITAL | Age: 60
End: 2020-10-07

## 2020-10-07 DIAGNOSIS — Z01.818 OTHER SPECIFIED PRE-OPERATIVE EXAMINATION: Primary | ICD-10-CM

## 2020-10-13 ENCOUNTER — LAB (OUTPATIENT)
Dept: LAB | Facility: HOSPITAL | Age: 60
End: 2020-10-13

## 2020-10-13 DIAGNOSIS — Z01.818 OTHER SPECIFIED PRE-OPERATIVE EXAMINATION: ICD-10-CM

## 2020-10-13 PROCEDURE — U0004 COV-19 TEST NON-CDC HGH THRU: HCPCS

## 2020-10-13 PROCEDURE — C9803 HOPD COVID-19 SPEC COLLECT: HCPCS

## 2020-10-14 LAB — SARS-COV-2 RNA RESP QL NAA+PROBE: NOT DETECTED

## 2020-10-15 ENCOUNTER — APPOINTMENT (OUTPATIENT)
Dept: GENERAL RADIOLOGY | Facility: HOSPITAL | Age: 60
End: 2020-10-15

## 2020-11-02 ENCOUNTER — TRANSCRIBE ORDERS (OUTPATIENT)
Dept: ADMINISTRATIVE | Facility: HOSPITAL | Age: 60
End: 2020-11-02

## 2020-11-02 DIAGNOSIS — Z01.818 OTHER SPECIFIED PRE-OPERATIVE EXAMINATION: Primary | ICD-10-CM

## 2020-11-06 ENCOUNTER — LAB (OUTPATIENT)
Dept: LAB | Facility: HOSPITAL | Age: 60
End: 2020-11-06

## 2020-11-06 DIAGNOSIS — Z01.818 OTHER SPECIFIED PRE-OPERATIVE EXAMINATION: ICD-10-CM

## 2020-11-06 PROCEDURE — C9803 HOPD COVID-19 SPEC COLLECT: HCPCS

## 2020-11-06 PROCEDURE — U0004 COV-19 TEST NON-CDC HGH THRU: HCPCS | Performed by: INTERNAL MEDICINE

## 2020-11-07 LAB — SARS-COV-2 RNA RESP QL NAA+PROBE: NOT DETECTED

## 2020-11-09 ENCOUNTER — APPOINTMENT (OUTPATIENT)
Dept: GENERAL RADIOLOGY | Facility: HOSPITAL | Age: 60
End: 2020-11-09

## 2020-11-19 ENCOUNTER — TRANSCRIBE ORDERS (OUTPATIENT)
Dept: ADMINISTRATIVE | Facility: HOSPITAL | Age: 60
End: 2020-11-19

## 2020-11-19 DIAGNOSIS — Z01.818 OTHER SPECIFIED PRE-OPERATIVE EXAMINATION: Primary | ICD-10-CM

## 2020-12-08 ENCOUNTER — LAB (OUTPATIENT)
Dept: LAB | Facility: HOSPITAL | Age: 60
End: 2020-12-08

## 2020-12-08 DIAGNOSIS — Z01.818 OTHER SPECIFIED PRE-OPERATIVE EXAMINATION: ICD-10-CM

## 2020-12-08 PROCEDURE — U0004 COV-19 TEST NON-CDC HGH THRU: HCPCS

## 2020-12-08 PROCEDURE — C9803 HOPD COVID-19 SPEC COLLECT: HCPCS

## 2020-12-09 LAB — SARS-COV-2 RNA RESP QL NAA+PROBE: NOT DETECTED

## 2021-02-04 ENCOUNTER — TRANSCRIBE ORDERS (OUTPATIENT)
Dept: ADMINISTRATIVE | Facility: HOSPITAL | Age: 61
End: 2021-02-04

## 2021-02-04 DIAGNOSIS — Z01.818 OTHER SPECIFIED PRE-OPERATIVE EXAMINATION: Primary | ICD-10-CM

## 2021-02-19 ENCOUNTER — LAB (OUTPATIENT)
Dept: LAB | Facility: HOSPITAL | Age: 61
End: 2021-02-19

## 2021-02-19 DIAGNOSIS — Z01.818 OTHER SPECIFIED PRE-OPERATIVE EXAMINATION: ICD-10-CM

## 2021-02-19 PROCEDURE — U0004 COV-19 TEST NON-CDC HGH THRU: HCPCS

## 2021-02-19 PROCEDURE — C9803 HOPD COVID-19 SPEC COLLECT: HCPCS

## 2021-02-20 LAB — SARS-COV-2 ORF1AB RESP QL NAA+PROBE: NOT DETECTED

## 2021-02-22 ENCOUNTER — TRANSCRIBE ORDERS (OUTPATIENT)
Dept: SLEEP MEDICINE | Facility: HOSPITAL | Age: 61
End: 2021-02-22

## 2021-02-22 ENCOUNTER — APPOINTMENT (OUTPATIENT)
Dept: GENERAL RADIOLOGY | Facility: HOSPITAL | Age: 61
End: 2021-02-22

## 2021-02-22 DIAGNOSIS — Z01.818 OTHER SPECIFIED PRE-OPERATIVE EXAMINATION: Primary | ICD-10-CM

## 2021-03-02 ENCOUNTER — LAB (OUTPATIENT)
Dept: LAB | Facility: HOSPITAL | Age: 61
End: 2021-03-02

## 2021-03-02 DIAGNOSIS — Z01.818 OTHER SPECIFIED PRE-OPERATIVE EXAMINATION: ICD-10-CM

## 2021-03-02 PROCEDURE — C9803 HOPD COVID-19 SPEC COLLECT: HCPCS

## 2021-03-02 PROCEDURE — U0004 COV-19 TEST NON-CDC HGH THRU: HCPCS

## 2021-03-03 LAB — SARS-COV-2 RNA RESP QL NAA+PROBE: NOT DETECTED

## 2021-03-04 ENCOUNTER — HOSPITAL ENCOUNTER (OUTPATIENT)
Dept: GENERAL RADIOLOGY | Facility: HOSPITAL | Age: 61
Discharge: HOME | End: 2021-03-04
Admitting: RADIOLOGY

## 2021-03-04 DIAGNOSIS — R13.19 ESOPHAGEAL DYSPHAGIA: ICD-10-CM

## 2021-03-04 PROCEDURE — 74220 X-RAY XM ESOPHAGUS 1CNTRST: CPT

## 2021-03-04 RX ADMIN — BARIUM SULFATE 700 MG: 700 TABLET ORAL at 07:30

## 2021-03-04 RX ADMIN — BARIUM SULFATE 65 ML: 960 POWDER, FOR SUSPENSION ORAL at 07:25

## 2021-03-04 NOTE — PROGRESS NOTES
Please inform the patient that esophagram shows small hiatal hernia and no evidence of reflux.  Arrange a follow-up because I have not seen her since September and she is just now completing this esophagram.

## 2021-03-08 DIAGNOSIS — K29.70 GASTRITIS WITHOUT BLEEDING, UNSPECIFIED CHRONICITY, UNSPECIFIED GASTRITIS TYPE: ICD-10-CM

## 2021-03-08 RX ORDER — PANTOPRAZOLE SODIUM 40 MG/1
40 TABLET, DELAYED RELEASE ORAL
Qty: 90 TABLET | Refills: 1 | Status: SHIPPED | OUTPATIENT
Start: 2021-03-08 | End: 2021-04-08

## 2021-03-08 NOTE — TELEPHONE ENCOUNTER
----- Message from Frances Bains sent at 3/8/2021  9:39 AM EST -----  Regarding: pantoprazole  Contact: [phone]  Patient [facility] [phone]

## 2021-03-08 NOTE — TELEPHONE ENCOUNTER
Patient called. She states she is out of Pantoprazole and needs a 30 day refill called into her [facility] pharmacy. She states she will pay cash price for the 30 day supply  Prescription written for Pantoprazole and awaiting Evi's signature.   F/u office visit with Evi scheduled for 04/08/2021@1500.

## 2021-03-08 NOTE — TELEPHONE ENCOUNTER
Per Evi: Please inform the patient that esophagram shows small hiatal hernia and no evidence of reflux.  Arrange a follow-up because I have not seen her since September and she is just now completing this esophagram.

## 2021-03-22 ENCOUNTER — BULK ORDERING (OUTPATIENT)
Dept: CASE MANAGEMENT | Facility: OTHER | Age: 61
End: 2021-03-22

## 2021-03-22 DIAGNOSIS — Z23 IMMUNIZATION DUE: ICD-10-CM

## 2021-03-24 ENCOUNTER — TELEPHONE (OUTPATIENT)
Dept: GASTROENTEROLOGY | Facility: CLINIC | Age: 61
End: 2021-03-24

## 2021-03-24 NOTE — TELEPHONE ENCOUNTER
"Returned patient's phone call. She states she is belching frequently and it smells like Sulfa, also complains of GERD.  States she has been taking Pantoprazole for \"years\".  She states she is experiencing nausea along with this.   States she is taking Zofran for her nausea.  Patient states this has been happening on and off for several weeks and its getting worse.   When asked if she is following a gastroparesis diet. She states no.   Advised patient to go to [email or web address] and download the diet. Advised to follow the diet closely until she is seen on 4/8.  Advised an update will be sent to Dr. Figueredo. She verb understanding.    "

## 2021-03-24 NOTE — TELEPHONE ENCOUNTER
----- Message from Hellen Baig RN sent at 3/24/2021  4:08 PM EDT -----  Regarding: FW: GI PROBLEMS  Contact: [phone]  Terell pt.   ----- Message -----  From: Frances Bains  Sent: 3/24/2021   3:47 PM EDT  To: Jenae Chilel [address]  Subject: GI PROBLEMS                                      Patient left an voicemail stated she's not feeling well.She is complain of gi problems...

## 2021-04-08 ENCOUNTER — TELEPHONE (OUTPATIENT)
Dept: GASTROENTEROLOGY | Facility: CLINIC | Age: 61
End: 2021-04-08

## 2021-04-08 ENCOUNTER — OFFICE VISIT (OUTPATIENT)
Dept: GASTROENTEROLOGY | Facility: CLINIC | Age: 61
End: 2021-04-08

## 2021-04-08 VITALS — HEIGHT: 66 IN | TEMPERATURE: 97.3 F | BODY MASS INDEX: 34.13 KG/M2 | WEIGHT: 212.4 LBS

## 2021-04-08 DIAGNOSIS — R13.19 ESOPHAGEAL DYSPHAGIA: ICD-10-CM

## 2021-04-08 DIAGNOSIS — R10.13 EPIGASTRIC PAIN: Primary | ICD-10-CM

## 2021-04-08 DIAGNOSIS — K31.84 GASTROPARESIS: ICD-10-CM

## 2021-04-08 DIAGNOSIS — R11.0 NAUSEA: ICD-10-CM

## 2021-04-08 PROCEDURE — 99214 OFFICE O/P EST MOD 30 MIN: CPT | Performed by: NURSE PRACTITIONER

## 2021-04-08 RX ORDER — LANSOPRAZOLE 30 MG/1
30 CAPSULE, DELAYED RELEASE ORAL 2 TIMES DAILY
Qty: 60 CAPSULE | Refills: 5 | Status: SHIPPED | OUTPATIENT
Start: 2021-04-08 | End: 2021-06-21 | Stop reason: SDUPTHER

## 2021-04-08 RX ORDER — ONDANSETRON 4 MG/1
4 TABLET, ORALLY DISINTEGRATING ORAL EVERY 8 HOURS PRN
Qty: 25 TABLET | Refills: 2 | Status: SHIPPED | OUTPATIENT
Start: 2021-04-08

## 2021-04-08 NOTE — PROGRESS NOTES
Chief Complaint   Patient presents with   • Abdominal Pain     HPI    Amanda Bettencourt is a  60 y.o. female here for a follow up visit for abdominal pain.  This patient follows Dr. Figueredo known to me.  Last seen in 2020.  We discussed completing an esophagram following office visit in September but patient did not complete this until last month.  Esophagram demonstrated a tiny hiatal hernia and some mild reflux but no obvious stricture or narrowing.    On visit today she reports worsening of symptoms over the past several months.  She is having esophageal dysphagia relieved with drinking water that occurs 2 days out of the week.  Significant nausea but no vomiting.  His epigastric pain described as a burning aching sensation that comes and goes.  She is also had an increase in dyspeptic symptoms despite daily PPI therapy.  She is currently on Protonix 40 mg once daily.  She has been on this particular PPI for a number of years.  She is taking Reglan 10 mg p.o. twice daily.  She struggles with gastroparesis diet.    No lower GI issues.    Her last colonoscopy was  with normal ileum and internal hemorrhoids, recall 10 years.  Past Medical History:   Diagnosis Date   • Allergic rhinitis    • Anemia    • Anxiety    • Back pain    • CTS (carpal tunnel syndrome)    • Depression    • Diabetes mellitus (CMS/HCC)    • Esophageal reflux    • Esophagitis, reflux    • Fatigue    • Fracture of left ankle 2015   • Hypercholesterolemia    • Hyperlipidemia    • Hypertension    • Hypokalemia    • Hyponatremia    • IBS (irritable bowel syndrome)    • Joint pain, knee     both knees   • Osteoarthritis, knee    • Osteoarthritis, shoulder    • Plantar fasciitis    • Sleep apnea        Past Surgical History:   Procedure Laterality Date   • ANKLE SURGERY Left 2017   •  SECTION     • COLONOSCOPY  2015    Non-bleeding internal hemorrhoids   • HYSTEROSCOPY ENDOMETRIAL ABLATION     • KNEE ARTHROSCOPY  Bilateral    • KNEE SURGERY Right 02/10/2017   • REPLACEMENT TOTAL KNEE Right 01/20/2017   • TONSILLECTOMY     • TOTAL KNEE ARTHROPLASTY Bilateral    • TUBAL ABDOMINAL LIGATION         Scheduled Meds:  Outpatient Encounter Medications as of 4/8/2021   Medication Sig Dispense Refill   • acetaminophen-codeine (TYLENOL #3) 300-30 MG per tablet TAKE 2 TABLETS BY MOUTH EVERY FOUR TO SIX HOURS AS NEEDED  0   • aspirin 81 MG tablet Take by mouth daily.     • cholecalciferol (VITAMIN D3) 50068 UNITS capsule Take 2,000 Units by mouth.     • ezetimibe (ZETIA) 10 MG tablet Take 1 tablet by mouth daily.     • FLUoxetine (PROzac) 20 MG capsule Take 4 capsules by mouth daily.     • gabapentin (NEURONTIN) 800 MG tablet Take 1 tablet by mouth 3 (Three) Times a Day. 270 tablet 1   • HYDROcodone-acetaminophen (NORCO) 5-325 MG per tablet TAKE 1 OR 2 TABLETS BY MOUTH EVERY SIX HOURS AS NEEDED  0   • lisinopril-hydrochlorothiazide (PRINZIDE,ZESTORETIC) 20-12.5 MG per tablet Take 1 tablet by mouth 2 (two) times a day.     • metoclopramide (REGLAN) 10 MG tablet TAKE 1 TABLET THREE TIMES A  tablet 3   • metoclopramide (REGLAN) 10 MG tablet Take 1 tablet by mouth 3 (Three) Times a Day. 90 tablet 0   • Multiple Vitamins-Minerals (MULTIVITAMIN ADULT PO) Take  by mouth.     • ondansetron (ZOFRAN) 8 MG tablet Take 8 mg by mouth 3 (Three) Times a Day As Needed.  2   • rosuvastatin (CRESTOR) 40 MG tablet Take 40 mg by mouth Daily.     • SitaGLIPtin-MetFORMIN HCl -1000 MG tablet sustained-release 24 hour Take by mouth.     • VENTOLIN  (90 Base) MCG/ACT inhaler INHALE 2 PUFFS BY MOUTH EVERY SIX HOURS AS NEEDED  2   • [DISCONTINUED] pantoprazole (PROTONIX) 40 MG EC tablet Take 1 tablet by mouth Every Morning Before Breakfast. 90 tablet 1   • Calcium Carbonate-Vit D-Min (CALCIUM 1200 PO) Take 1,200 mg by mouth Daily.     • lansoprazole (PREVACID) 30 MG capsule Take 1 capsule by mouth 2 (Two) Times a Day. 60 capsule 5     No  facility-administered encounter medications on file as of 4/8/2021.       Continuous Infusions:No current facility-administered medications for this visit.      PRN Meds:.    No Known Allergies    Social History     Socioeconomic History   • Marital status:      Spouse name: Not on file   • Number of children: 2   • Years of education: 12   • Highest education level: High school graduate   Tobacco Use   • Smoking status: Current Every Day Smoker     Packs/day: 1.00     Types: Cigarettes     Start date: 1976   • Smokeless tobacco: Never Used   • Tobacco comment: Began smoking age 19.  Although she is currently smoking 2 ppd, she smoked 1.25 ppd for about 11 years and then 2.25 ppd an average for 26 years for a 72.25 pack year history.   Substance and Sexual Activity   • Alcohol use: Yes     Comment: social   • Drug use: No   • Sexual activity: Defer       Family History   Problem Relation Age of Onset   • Arthritis Other    • Cancer Other    • Heart disease Other    • Hyperlipidemia Other    • Stroke Other    • Colon cancer Maternal Grandmother        Review of Systems   Constitutional: Negative for activity change, appetite change, fatigue, fever and unexpected weight change.   HENT: Positive for trouble swallowing.    Respiratory: Negative for apnea, cough, choking, chest tightness, shortness of breath and wheezing.    Cardiovascular: Negative for chest pain, palpitations and leg swelling.   Gastrointestinal: Positive for abdominal pain and nausea. Negative for abdominal distention, anal bleeding, blood in stool, constipation, diarrhea, rectal pain and vomiting.       Vitals:    04/08/21 1446   Temp: 97.3 °F (36.3 °C)       Physical Exam  Constitutional:       Appearance: She is well-developed.   Cardiovascular:      Rate and Rhythm: Normal rate and regular rhythm.      Heart sounds: Normal heart sounds.   Pulmonary:      Effort: Pulmonary effort is normal. No respiratory distress.      Breath sounds: Normal  breath sounds. No wheezing.   Abdominal:      General: Bowel sounds are normal. There is no distension.      Palpations: Abdomen is soft. There is no mass.      Tenderness: There is no abdominal tenderness. There is no guarding.      Hernia: No hernia is present.   Skin:     Capillary Refill: Capillary refill takes less than 2 seconds.   Neurological:      Mental Status: She is alert and oriented to person, place, and time.   Psychiatric:         Behavior: Behavior normal.     Assessment    Epigastric pain  Dysphagia  Nausea  Dyspepsia  Gastroparesis    Plan    Pleasant 60-year-old female seen today in follow-up for complaints of epigastric pain, esophageal dysphagia, nausea, dyspepsia despite twice daily dosing PPI therapy and Reglan to treat gastroparesis.  Patient does admit to frequent dietary indiscretions and I reinforced the need for strict gastroparesis diet on visit today.  Offered referral to dietitian but patient declines.  I gave her educational handout detailing gastroparesis diet in great detail.  At this point recommend EGD with Dr. Figueredo for further evaluation.  Stop Protonix and start lansoprazole p.o. twice daily.  Labs today to include CBC and CMP.    Follow-up and further recommendations pending the aforementioned work-up.

## 2021-04-09 ENCOUNTER — TELEPHONE (OUTPATIENT)
Dept: GASTROENTEROLOGY | Facility: CLINIC | Age: 61
End: 2021-04-09

## 2021-04-09 DIAGNOSIS — R19.7 DIARRHEA, UNSPECIFIED TYPE: Primary | ICD-10-CM

## 2021-04-09 LAB
ALBUMIN SERPL-MCNC: 4.2 G/DL (ref 3.5–5.2)
ALBUMIN/GLOB SERPL: 1.8 G/DL
ALP SERPL-CCNC: 72 U/L (ref 39–117)
ALT SERPL-CCNC: 12 U/L (ref 1–33)
AST SERPL-CCNC: 16 U/L (ref 1–32)
BASOPHILS # BLD AUTO: 0.06 10*3/MM3 (ref 0–0.2)
BASOPHILS NFR BLD AUTO: 0.5 % (ref 0–1.5)
BILIRUB SERPL-MCNC: <0.2 MG/DL (ref 0–1.2)
BUN SERPL-MCNC: 4 MG/DL (ref 8–23)
BUN/CREAT SERPL: 5.9 (ref 7–25)
CALCIUM SERPL-MCNC: 9.5 MG/DL (ref 8.6–10.5)
CHLORIDE SERPL-SCNC: 90 MMOL/L (ref 98–107)
CO2 SERPL-SCNC: 34.7 MMOL/L (ref 22–29)
CREAT SERPL-MCNC: 0.68 MG/DL (ref 0.57–1)
EOSINOPHIL # BLD AUTO: 0.99 10*3/MM3 (ref 0–0.4)
EOSINOPHIL NFR BLD AUTO: 7.9 % (ref 0.3–6.2)
ERYTHROCYTE [DISTWIDTH] IN BLOOD BY AUTOMATED COUNT: 12.4 % (ref 12.3–15.4)
GLOBULIN SER CALC-MCNC: 2.3 GM/DL
GLUCOSE SERPL-MCNC: 96 MG/DL (ref 65–99)
HCT VFR BLD AUTO: 36.4 % (ref 34–46.6)
HGB BLD-MCNC: 12.9 G/DL (ref 12–15.9)
IMM GRANULOCYTES # BLD AUTO: 0.09 10*3/MM3 (ref 0–0.05)
IMM GRANULOCYTES NFR BLD AUTO: 0.7 % (ref 0–0.5)
LYMPHOCYTES # BLD AUTO: 4.34 10*3/MM3 (ref 0.7–3.1)
LYMPHOCYTES NFR BLD AUTO: 34.7 % (ref 19.6–45.3)
MCH RBC QN AUTO: 30.4 PG (ref 26.6–33)
MCHC RBC AUTO-ENTMCNC: 35.4 G/DL (ref 31.5–35.7)
MCV RBC AUTO: 85.8 FL (ref 79–97)
MONOCYTES # BLD AUTO: 0.71 10*3/MM3 (ref 0.1–0.9)
MONOCYTES NFR BLD AUTO: 5.7 % (ref 5–12)
NEUTROPHILS # BLD AUTO: 6.31 10*3/MM3 (ref 1.7–7)
NEUTROPHILS NFR BLD AUTO: 50.5 % (ref 42.7–76)
NRBC BLD AUTO-RTO: 0 /100 WBC (ref 0–0.2)
PLATELET # BLD AUTO: 329 10*3/MM3 (ref 140–450)
POTASSIUM SERPL-SCNC: 2.9 MMOL/L (ref 3.5–5.2)
PROT SERPL-MCNC: 6.5 G/DL (ref 6–8.5)
RBC # BLD AUTO: 4.24 10*6/MM3 (ref 3.77–5.28)
SODIUM SERPL-SCNC: 136 MMOL/L (ref 136–145)
WBC # BLD AUTO: 12.5 10*3/MM3 (ref 3.4–10.8)

## 2021-04-09 NOTE — PROGRESS NOTES
I am glad she is calling her PCP.  Lets have her do stool testing to include GI PCR and C. Difficile.  Can we touch base with her and see if she got through to Dr. Chacko?

## 2021-04-09 NOTE — TELEPHONE ENCOUNTER
Patient called.   Dr. Chacko has prescribed K+ and patient to see her in the office on Tuesday.   Patient will  the stool kit on Tuesday.   Update to Evi.

## 2021-04-09 NOTE — TELEPHONE ENCOUNTER
----- Message from PAPA Fields sent at 4/9/2021  1:47 PM EDT -----  I am glad she is calling her PCP.  Lets have her do stool testing to include GI PCR and C. Difficile.  Can we touch base with her and see if she got through to Dr. Chacko?

## 2021-04-09 NOTE — TELEPHONE ENCOUNTER
----- Message from PAPA Fields sent at 4/9/2021  9:06 AM EDT -----  Let Amanda know that her labs show an elevated white blood cell count.  Is she having any issues with diarrhea, dysuria, or increased urinary frequency?  Let me know.    Her potassium is also low at 2.9.  If she is on a potassium replacement?  I will CC her primary care provider as well.

## 2021-04-09 NOTE — PROGRESS NOTES
Good morning,    Amanda is a mutual patient of ours.  We are trying to sort out her upper GI issues and she is going to have an EGD with Dr. Figueredo.  Routine lab work drawn yesterday with a potassium of 2.9.  Her white blood cell count is also elevated.  We are following up with her to see if she is having any urinary symptoms however she made no mention of diarrhea on visit yesterday.

## 2021-04-09 NOTE — TELEPHONE ENCOUNTER
Patient called. Advised as per Evi's note. She verb understanding. She states she is going to f.u with  her PCP today.   Denies any dysuria or urinary frequency. States she occasionally has diarrhea. Update to Evi.

## 2021-04-09 NOTE — PROGRESS NOTES
Let Amanda know that her labs show an elevated white blood cell count.  Is she having any issues with diarrhea, dysuria, or increased urinary frequency?  Let me know.    Her potassium is also low at 2.9.  If she is on a potassium replacement?  I will CC her primary care provider as well.

## 2021-04-20 ENCOUNTER — TRANSCRIBE ORDERS (OUTPATIENT)
Dept: SLEEP MEDICINE | Facility: HOSPITAL | Age: 61
End: 2021-04-20

## 2021-04-20 DIAGNOSIS — Z01.818 OTHER SPECIFIED PRE-OPERATIVE EXAMINATION: Primary | ICD-10-CM

## 2021-05-03 ENCOUNTER — LAB (OUTPATIENT)
Dept: LAB | Facility: HOSPITAL | Age: 61
End: 2021-05-03

## 2021-05-03 DIAGNOSIS — Z01.818 OTHER SPECIFIED PRE-OPERATIVE EXAMINATION: ICD-10-CM

## 2021-05-03 PROCEDURE — U0004 COV-19 TEST NON-CDC HGH THRU: HCPCS

## 2021-05-03 PROCEDURE — C9803 HOPD COVID-19 SPEC COLLECT: HCPCS

## 2021-05-04 LAB — SARS-COV-2 RNA RESP QL NAA+PROBE: NOT DETECTED

## 2021-05-04 RX ORDER — HYDROCODONE BITARTRATE AND ACETAMINOPHEN 10; 325 MG/1; MG/1
1 TABLET ORAL 3 TIMES DAILY
COMMUNITY

## 2021-05-04 RX ORDER — DOXAZOSIN MESYLATE 1 MG/1
1 TABLET ORAL NIGHTLY
COMMUNITY

## 2021-05-04 RX ORDER — IRBESARTAN 300 MG/1
300 TABLET ORAL DAILY
COMMUNITY

## 2021-05-05 ENCOUNTER — ANESTHESIA EVENT (OUTPATIENT)
Dept: GASTROENTEROLOGY | Facility: HOSPITAL | Age: 61
End: 2021-05-05

## 2021-05-05 ENCOUNTER — HOSPITAL ENCOUNTER (OUTPATIENT)
Facility: HOSPITAL | Age: 61
Setting detail: HOSPITAL OUTPATIENT SURGERY
Discharge: HOME | End: 2021-05-05
Attending: INTERNAL MEDICINE | Admitting: INTERNAL MEDICINE

## 2021-05-05 ENCOUNTER — ANESTHESIA (OUTPATIENT)
Dept: GASTROENTEROLOGY | Facility: HOSPITAL | Age: 61
End: 2021-05-05

## 2021-05-05 VITALS
HEART RATE: 76 BPM | WEIGHT: 213 LBS | HEIGHT: 66 IN | SYSTOLIC BLOOD PRESSURE: 154 MMHG | DIASTOLIC BLOOD PRESSURE: 78 MMHG | OXYGEN SATURATION: 97 % | TEMPERATURE: 98.9 F | RESPIRATION RATE: 15 BRPM | BODY MASS INDEX: 34.23 KG/M2

## 2021-05-05 DIAGNOSIS — R13.19 ESOPHAGEAL DYSPHAGIA: ICD-10-CM

## 2021-05-05 DIAGNOSIS — R10.13 EPIGASTRIC PAIN: ICD-10-CM

## 2021-05-05 DIAGNOSIS — R11.0 NAUSEA: ICD-10-CM

## 2021-05-05 LAB
GLUCOSE BLDC GLUCOMTR-MCNC: 105 MG/DL (ref 70–130)
GLUCOSE BLDC GLUCOMTR-MCNC: 105 MG/DL (ref 70–130)

## 2021-05-05 PROCEDURE — S0260 H&P FOR SURGERY: HCPCS | Performed by: INTERNAL MEDICINE

## 2021-05-05 PROCEDURE — 88305 TISSUE EXAM BY PATHOLOGIST: CPT | Performed by: INTERNAL MEDICINE

## 2021-05-05 PROCEDURE — 82962 GLUCOSE BLOOD TEST: CPT

## 2021-05-05 PROCEDURE — 43239 EGD BIOPSY SINGLE/MULTIPLE: CPT | Performed by: INTERNAL MEDICINE

## 2021-05-05 PROCEDURE — 25010000002 PROPOFOL 10 MG/ML EMULSION: Performed by: ANESTHESIOLOGY

## 2021-05-05 RX ORDER — SODIUM CHLORIDE 0.9 % (FLUSH) 0.9 %
3 SYRINGE (ML) INJECTION EVERY 12 HOURS SCHEDULED
Status: DISCONTINUED | OUTPATIENT
Start: 2021-05-05 | End: 2021-05-05 | Stop reason: HOSPADM

## 2021-05-05 RX ORDER — SODIUM CHLORIDE 0.9 % (FLUSH) 0.9 %
10 SYRINGE (ML) INJECTION AS NEEDED
Status: DISCONTINUED | OUTPATIENT
Start: 2021-05-05 | End: 2021-05-05 | Stop reason: HOSPADM

## 2021-05-05 RX ORDER — PROPOFOL 10 MG/ML
VIAL (ML) INTRAVENOUS AS NEEDED
Status: DISCONTINUED | OUTPATIENT
Start: 2021-05-05 | End: 2021-05-05 | Stop reason: SURG

## 2021-05-05 RX ORDER — SODIUM CHLORIDE, SODIUM LACTATE, POTASSIUM CHLORIDE, CALCIUM CHLORIDE 600; 310; 30; 20 MG/100ML; MG/100ML; MG/100ML; MG/100ML
30 INJECTION, SOLUTION INTRAVENOUS CONTINUOUS PRN
Status: DISCONTINUED | OUTPATIENT
Start: 2021-05-05 | End: 2021-05-05 | Stop reason: HOSPADM

## 2021-05-05 RX ORDER — LIDOCAINE HYDROCHLORIDE 20 MG/ML
INJECTION, SOLUTION INFILTRATION; PERINEURAL AS NEEDED
Status: DISCONTINUED | OUTPATIENT
Start: 2021-05-05 | End: 2021-05-05 | Stop reason: SURG

## 2021-05-05 RX ADMIN — PROPOFOL 20 MG: 10 INJECTION, EMULSION INTRAVENOUS at 08:13

## 2021-05-05 RX ADMIN — PROPOFOL 20 MG: 10 INJECTION, EMULSION INTRAVENOUS at 08:17

## 2021-05-05 RX ADMIN — SODIUM CHLORIDE, POTASSIUM CHLORIDE, SODIUM LACTATE AND CALCIUM CHLORIDE 30 ML/HR: 600; 310; 30; 20 INJECTION, SOLUTION INTRAVENOUS at 07:37

## 2021-05-05 RX ADMIN — PROPOFOL 130 MG: 10 INJECTION, EMULSION INTRAVENOUS at 08:12

## 2021-05-05 RX ADMIN — PROPOFOL 20 MG: 10 INJECTION, EMULSION INTRAVENOUS at 08:19

## 2021-05-05 RX ADMIN — PROPOFOL 20 MG: 10 INJECTION, EMULSION INTRAVENOUS at 08:15

## 2021-05-05 RX ADMIN — LIDOCAINE HYDROCHLORIDE 60 MG: 20 INJECTION, SOLUTION INFILTRATION; PERINEURAL at 08:12

## 2021-05-05 NOTE — ANESTHESIA PREPROCEDURE EVALUATION
Anesthesia Evaluation     Patient summary reviewed   no history of anesthetic complications:  NPO Solid Status: > 8 hours  NPO Liquid Status: > 2 hours           Airway   Mallampati: II  TM distance: >3 FB  Neck ROM: full  Dental    (+) edentulous    Pulmonary     breath sounds clear to auscultation  (+) a smoker Current Abstained day of surgery, sleep apnea,   (-) shortness of breath  Cardiovascular   Exercise tolerance: good (4-7 METS)    Rhythm: regular  Rate: normal    (+) hypertension, hyperlipidemia,   (-) angina, SIDDIQUI      Neuro/Psych  (+) numbness, psychiatric history Anxiety and Depression,     GI/Hepatic/Renal/Endo    (+)  GERD,  diabetes mellitus,     Musculoskeletal     (+) back pain,   Abdominal    Substance History   (+) alcohol use,      OB/GYN          Other   arthritis,                    Anesthesia Plan    ASA 3     MAC   (MAC anesthesia discussed with patient and/or patient representative. Risks (including but not limited to intra-op awareness), benefits, and alternatives were discussed. Understanding was voiced with an agreement to proceed with a MAC technique and General as a backup option.   )  intravenous induction     Anesthetic plan, all risks, benefits, and alternatives have been provided, discussed and informed consent has been obtained with: patient.

## 2021-05-05 NOTE — ANESTHESIA POSTPROCEDURE EVALUATION
"Patient: Amanda Bettencourt    Procedure Summary     Date: 05/05/21 Room / Location:  [facility] / [facility] END[facility]COPY    Anesthesia Start: 0752 Anesthesia Stop: 0823    Procedure: ESOPHAGOGASTRODUODENOSCOPY with cold bxs (N/A Esophagus) Diagnosis:       Epigastric pain      Nausea      Esophageal dysphagia      Gastroparesis      Esophagitis      (Epigastric pain [R10.13])      (Nausea [R11.0])      (Esophageal dysphagia [R13.10])    Surgeons: David Figueredo MD Provider: Samson Brown MD    Anesthesia Type: MAC ASA Status: 3          Anesthesia Type: MAC    Vitals  Vitals Value Taken Time   /78 05/05/21 0840   Temp     Pulse 76 05/05/21 0840   Resp 15 05/05/21 0840   SpO2 100 % 05/05/21 0842   Vitals shown include unvalidated device data.        Post Anesthesia Care and Evaluation    Patient location during evaluation: bedside  Patient participation: complete - patient participated  Level of consciousness: awake and alert  Pain management: adequate  Airway patency: patent  Anesthetic complications: No anesthetic complications    Cardiovascular status: acceptable  Respiratory status: acceptable  Hydration status: acceptable    Comments: /78   Pulse 76   Temp 37.2 °C (98.9 °F) (Oral)   Resp 15   Ht 167.6 cm (66\")   Wt 96.6 kg (213 lb)   SpO2 97%   BMI 34.38 kg/m²       "

## 2021-05-06 LAB
LAB AP CASE REPORT: NORMAL
PATH REPORT.FINAL DX SPEC: NORMAL
PATH REPORT.GROSS SPEC: NORMAL

## 2021-05-10 ENCOUNTER — TELEPHONE (OUTPATIENT)
Dept: GASTROENTEROLOGY | Facility: CLINIC | Age: 61
End: 2021-05-10

## 2021-05-10 NOTE — TELEPHONE ENCOUNTER
Returned patient's phone call. She states she is feeling much better. No difficulties swallowing any more.

## 2021-05-10 NOTE — TELEPHONE ENCOUNTER
----- Message from Alan Oates sent at 5/10/2021  8:20 AM EDT -----  Regarding: Trouble swallowing  Contact: [phone]  PT just had an EGD and has been having difficulty swallowing, please call PT

## 2021-06-21 ENCOUNTER — TELEPHONE (OUTPATIENT)
Dept: GASTROENTEROLOGY | Facility: CLINIC | Age: 61
End: 2021-06-21

## 2021-06-21 DIAGNOSIS — R11.0 NAUSEA: Primary | ICD-10-CM

## 2021-07-08 RX ORDER — LANSOPRAZOLE 30 MG/1
30 CAPSULE, DELAYED RELEASE ORAL 2 TIMES DAILY
Qty: 180 CAPSULE | Refills: 3 | Status: SHIPPED | OUTPATIENT
Start: 2021-07-08 | End: 2022-07-08 | Stop reason: SDUPTHER

## 2021-08-24 ENCOUNTER — APPOINTMENT (OUTPATIENT)
Dept: NUCLEAR MEDICINE | Facility: HOSPITAL | Age: 61
End: 2021-08-24

## 2021-10-07 DIAGNOSIS — K31.84 GASTROPARESIS: ICD-10-CM

## 2021-10-08 RX ORDER — METOCLOPRAMIDE 10 MG/1
TABLET ORAL
Qty: 270 TABLET | Refills: 3 | Status: SHIPPED | OUTPATIENT
Start: 2021-10-08 | End: 2022-08-30

## 2021-10-15 ENCOUNTER — HOSPITAL ENCOUNTER (OUTPATIENT)
Dept: NUCLEAR MEDICINE | Facility: HOSPITAL | Age: 61
End: 2021-10-15

## 2021-12-16 ENCOUNTER — HOSPITAL ENCOUNTER (OUTPATIENT)
Dept: NUCLEAR MEDICINE | Facility: HOSPITAL | Age: 61
End: 2021-12-16

## 2022-02-10 ENCOUNTER — APPOINTMENT (OUTPATIENT)
Dept: NUCLEAR MEDICINE | Facility: HOSPITAL | Age: 62
End: 2022-02-10

## 2022-07-08 ENCOUNTER — TELEPHONE (OUTPATIENT)
Dept: GASTROENTEROLOGY | Facility: CLINIC | Age: 62
End: 2022-07-08

## 2022-07-08 RX ORDER — LANSOPRAZOLE 30 MG/1
30 CAPSULE, DELAYED RELEASE ORAL 2 TIMES DAILY
Qty: 60 CAPSULE | Refills: 0 | Status: SHIPPED | OUTPATIENT
Start: 2022-07-08 | End: 2022-08-30 | Stop reason: SDUPTHER

## 2022-07-08 NOTE — TELEPHONE ENCOUNTER
Caller: Amanda Bettencourt    Relationship: Self    Best call back number: [phone]    Requested Prescriptions: LANSOPRAZOLE 30 MILLIGRAMS TWICE PER DAY    Pharmacy where request should be sent:  [facility] [address] [place] [address] [phone]    Additional details provided by patient: PATIENT DOES NOT HAVE ANYMORE REFILLS AND HAS MOVED PHARMACY FROM [facility] TO [facility].    PATIENT IS NOT SURE IF NEED TO BE SCHEDULED TO COME IN FOR UPDATED PRESCRIPTION. PLEASE CALL PATIENT    Does the patient have less than a 3 day supply:  [] Yes  [x] No    Alan RASHID   07/08/22 11:03 EDT

## 2022-07-08 NOTE — TELEPHONE ENCOUNTER
Returned patient's phone call.   Yearly f/u visit scheduled with Evi on 7/20@1400.  Lansoprazole refilled for one month without refills.

## 2022-08-16 ENCOUNTER — APPOINTMENT (OUTPATIENT)
Dept: OTHER | Facility: HOSPITAL | Age: 62
End: 2022-08-16

## 2022-08-30 ENCOUNTER — TELEMEDICINE (OUTPATIENT)
Dept: GASTROENTEROLOGY | Facility: CLINIC | Age: 62
End: 2022-08-30

## 2022-08-30 VITALS — HEIGHT: 66 IN | WEIGHT: 214 LBS | BODY MASS INDEX: 34.39 KG/M2

## 2022-08-30 DIAGNOSIS — R11.0 NAUSEA: ICD-10-CM

## 2022-08-30 DIAGNOSIS — K21.9 GASTROESOPHAGEAL REFLUX DISEASE WITHOUT ESOPHAGITIS: Primary | ICD-10-CM

## 2022-08-30 DIAGNOSIS — F17.210 SMOKING GREATER THAN 30 PACK YEARS: ICD-10-CM

## 2022-08-30 DIAGNOSIS — Z12.2 SCREENING FOR MALIGNANT NEOPLASM OF RESPIRATORY ORGAN: Primary | ICD-10-CM

## 2022-08-30 PROCEDURE — 99214 OFFICE O/P EST MOD 30 MIN: CPT | Performed by: NURSE PRACTITIONER

## 2022-08-30 RX ORDER — LANSOPRAZOLE 30 MG/1
30 CAPSULE, DELAYED RELEASE ORAL 2 TIMES DAILY
Qty: 180 CAPSULE | Refills: 3 | Status: SHIPPED | OUTPATIENT
Start: 2022-08-30

## 2022-08-30 NOTE — PROGRESS NOTES
Chief Complaint   Patient presents with   • Heartburn       HPI    Amanda Bettencourt is a  62 y.o. female here for a follow up visit for GERD.    This patient follows with Dr. Figueredo and myself.    She has a history of grade B reflux esophagitis seen on EGD last May.  Patient also noted to have medium of food residual in the stomach on endoscopy recommended to complete gastric emptying study but has not done so.    She currently reports adequate control of heartburn on BID lansoprazole, needs a refill.  She still takes Zofran when needed for breakthrough nausea.  She also takes Reglan 10 mg p.o. twice daily.  She admits she did not complete gastric emptying study due to ailing dog.  Denies abdominal pain, poor appetite, weight loss.  Is not on a gastroparesis diet.    1 or 2 episodes of diarrhea as of late but this has resolved.  No rectal bleeding.  She is due for screening colonoscopy in . ([facility] Cscope )    Past Medical History:   Diagnosis Date   • Allergic rhinitis    • Anemia    • Anxiety    • Back pain    • CTS (carpal tunnel syndrome)    • Depression    • Diabetes mellitus (HCC)    • Difficulty swallowing solids    • Esophageal reflux    • Esophagitis, reflux    • Fatigue    • Fracture of left ankle 2015   • Hypercholesterolemia    • Hyperlipidemia    • Hypertension    • Hypokalemia    • Hyponatremia    • IBS (irritable bowel syndrome)    • Joint pain, knee     both knees   • Osteoarthritis, knee    • Osteoarthritis, shoulder    • Plantar fasciitis    • Sleep apnea        Past Surgical History:   Procedure Laterality Date   • ANKLE SURGERY Left 2017   •  SECTION     • COLONOSCOPY  2015    Non-bleeding internal hemorrhoids   • ENDOSCOPY N/A 2021    Procedure: ESOPHAGOGASTRODUODENOSCOPY with cold bxs;  Surgeon: David Figueredo MD;  Location: [facility] ENDOSCOPY;  Service: Gastroenterology;  Laterality: N/A;  PRE: Esophageal Dysphagia  POST: Gastroparesis, Reflux  Esophagitis   • HYSTEROSCOPY ENDOMETRIAL ABLATION     • KNEE ARTHROSCOPY Bilateral    • KNEE SURGERY Right 02/10/2017   • REPLACEMENT TOTAL KNEE Right 01/20/2017   • TONSILLECTOMY     • TOTAL KNEE ARTHROPLASTY Bilateral    • TUBAL ABDOMINAL LIGATION         Scheduled Meds:     Continuous Infusions: No current facility-administered medications for this visit.      PRN Meds:     No Known Allergies    Social History     Socioeconomic History   • Marital status:    • Number of children: 2   • Years of education: 12   • Highest education level: High school graduate   Tobacco Use   • Smoking status: Current Every Day Smoker     Packs/day: 1.00     Types: Cigarettes     Start date: 1/1/1976   • Smokeless tobacco: Never Used   • Tobacco comment: Began smoking age 19.  She smoked 1.25 ppd for 11 years, 2.25 ppd for 26 years, 2 ppd for 2 years and 1 ppd for the past 4 years for an 80.25 pack year history.   Substance and Sexual Activity   • Alcohol use: Yes     Comment: social   • Drug use: No   • Sexual activity: Yes     Partners: Male     Birth control/protection: Surgical, Tubal ligation       Family History   Problem Relation Age of Onset   • Arthritis Other    • Cancer Other    • Heart disease Other    • Hyperlipidemia Other    • Stroke Other    • Colon cancer Maternal Grandmother    • Malig Hyperthermia Neg Hx        Review of Systems   Constitutional: Negative for activity change, appetite change, fatigue, fever and unexpected weight change.   HENT: Negative for trouble swallowing.    Respiratory: Negative for apnea, cough, choking, chest tightness, shortness of breath and wheezing.    Cardiovascular: Negative for chest pain, palpitations and leg swelling.   Gastrointestinal: Positive for nausea. Negative for abdominal distention, abdominal pain, anal bleeding, blood in stool, constipation, diarrhea, rectal pain and vomiting.       There were no vitals filed for this visit.    Physical Exam  Neurological:       Mental Status: She is alert and oriented to person, place, and time.   Psychiatric:         Mood and Affect: Mood normal.         Behavior: Behavior normal.       Assessment    Diagnoses and all orders for this visit:    1. Gastroesophageal reflux disease without esophagitis (Primary)  -     NM Gastric Emptying; Future    2. Nausea  Overview:  Added automatically from request for surgery [id number]    Orders:  -     NM Gastric Emptying; Future    Other orders  -     lansoprazole (PREVACID) 30 MG capsule; Take 1 capsule by mouth 2 (Two) Times a Day.  Dispense: 180 capsule; Refill: 3     Plan    Pleasant 62-year-old female seen today in follow-up for GERD and nausea with likely gastroparesis but has not completed gastric emptying study.  We discussed her prior EGD as well as pathology all questions are answered.  Patient willing to follow through with nuclear medicine gastric emptying study which I have reordered.  Hold Reglan for testing.  In the meanwhile continue twice daily lansoprazole and Reglan.    We did discuss the very rare (<1%) risk of irreversible tardive dyskinesia with reglan use and she knows to discontinue the medication and call me should any tremors, tongue rolling, etc, develop.    Regarding episodes of diarrhea lately recommend she use once daily probiotics, Eli' colon health, to promote healthy bowel pattern.  No indication for colonoscopy at this time.    Further recommendations and follow-up pending gastric emptying study results.         PAPA Fields  [facility] ([facility]  [address] [place] [address]  Office: [phone]

## 2022-08-30 NOTE — PROGRESS NOTES
Patient was scheduled for LDCT on 8/16/2022 and 8/26/2022 and was a late cancel for each of these appointments due to illness.  Contacted the patient today to verify tobacco history.  Patient actually completed the required shared decision making visit on 9/6/2017 before her initial screening.  Referral from Dr. Chacko for return annual screening.  Order placed for scheduling and cc-ed results to Dr. Chacko.  Patient aware that she will be contacted by the scheduling department for scheduling.

## 2022-09-16 ENCOUNTER — TELEPHONE (OUTPATIENT)
Dept: GASTROENTEROLOGY | Facility: CLINIC | Age: 62
End: 2022-09-16

## 2022-09-16 NOTE — TELEPHONE ENCOUNTER
"Returned patient's phone call.   She states she is experiencing increased heartburn. She reports she does it happens one to two times a week. She also reports she feels as if food is getting stuck in her throat and nausea. On occasion burps up foul tasting and smelling burps.   Is taking Lansoprazole twice a day.   Following anti-reflux diet \"as much as she can\".   She does eat late at night and advised not to do this and to sleep with her HOB up 30 degrees.   Advised an update will be sent to Evi. She verb understanding.       "

## 2022-09-16 NOTE — TELEPHONE ENCOUNTER
Caller: Amanda Bettencourt    Relationship: Self    Best call back number: [phone]    What is the best time to reach you: ANYTIME    Who are you requesting to speak with (clinical staff, provider,  specific staff member): CLINICAL STAFF    What was the call regarding: PT STATES SHE IS EXPERIENCING ALMOST CONSTANT HEARTBURN. HAS HEARTBURN NOW AND HAS NOT EATEN TODAY. WANTS TO KNOW WHAT SHE CAN TAKE OTC THAT MIGHT HELP.    Do you require a callback: YES

## 2022-09-19 NOTE — TELEPHONE ENCOUNTER
Continue twice daily lansoprazole.  Try FD guard 2 tablets p.o. twice daily.  Strict antireflux diet.  Recommend small portion meals.

## 2022-09-19 NOTE — TELEPHONE ENCOUNTER
Called pt and passed on Evi's recommendations.  Pt scheduled for 10/20, nothing available sooner. Pt would like to know what to do in the mean time about her symptoms

## 2022-10-20 ENCOUNTER — OFFICE VISIT (OUTPATIENT)
Dept: GASTROENTEROLOGY | Facility: CLINIC | Age: 62
End: 2022-10-20

## 2022-10-20 VITALS
OXYGEN SATURATION: 98 % | BODY MASS INDEX: 40.37 KG/M2 | HEART RATE: 72 BPM | WEIGHT: 213.8 LBS | SYSTOLIC BLOOD PRESSURE: 146 MMHG | TEMPERATURE: 95.2 F | DIASTOLIC BLOOD PRESSURE: 82 MMHG | HEIGHT: 61 IN

## 2022-10-20 DIAGNOSIS — R11.0 NAUSEA: ICD-10-CM

## 2022-10-20 DIAGNOSIS — K31.84 GASTROPARESIS: ICD-10-CM

## 2022-10-20 DIAGNOSIS — K21.00 GASTROESOPHAGEAL REFLUX DISEASE WITH ESOPHAGITIS WITHOUT HEMORRHAGE: Primary | ICD-10-CM

## 2022-10-20 PROCEDURE — 99213 OFFICE O/P EST LOW 20 MIN: CPT | Performed by: NURSE PRACTITIONER

## 2022-10-20 RX ORDER — ONDANSETRON HYDROCHLORIDE 8 MG/1
TABLET, FILM COATED ORAL
COMMUNITY
Start: 2022-09-22 | End: 2022-10-20 | Stop reason: SDUPTHER

## 2022-10-20 RX ORDER — LIDOCAINE 50 MG/G
PATCH TOPICAL
COMMUNITY
Start: 2022-08-29

## 2022-10-20 RX ORDER — FLUOXETINE HYDROCHLORIDE 40 MG/1
40 CAPSULE ORAL 2 TIMES DAILY
COMMUNITY
Start: 2022-09-30

## 2022-10-20 RX ORDER — METHOCARBAMOL 500 MG/1
TABLET, FILM COATED ORAL
COMMUNITY
Start: 2022-09-27

## 2022-10-20 RX ORDER — METOCLOPRAMIDE 10 MG/1
10 TABLET ORAL 3 TIMES DAILY
Qty: 270 TABLET | Refills: 3 | Status: SHIPPED | OUTPATIENT
Start: 2022-10-20

## 2022-10-20 NOTE — PROGRESS NOTES
Chief Complaint   Patient presents with   • Heartburn       HPI    Amanda Bettencourt is a  62 y.o. female here for a follow up visit for GERD.    This patient follows with Dr. Figueredo and myself.    She has a history of grade B reflux esophagitis seen on EGD last May.  Patient also noted to have medium of food residual in the stomach on endoscopy recommended to complete gastric emptying study.  She delayed gastric emptying study until next month scheduled for after Thanksgiving.    She reports feeling well managed on a combination of twice daily dosing Prevacid and 3 times daily dosing of Reglan.  Sometimes she will take 1 or 2 tablets of Reglan in a 24-hour period.  No abdominal pain, vomiting, dysphagia, odynophagia reported on visit today.  Nausea improving with Reglan.    No lower GI complaints.    She is due for screening colonoscopy in . (NL Cscope )    Past Medical History:   Diagnosis Date   • Allergic rhinitis    • Anemia    • Anxiety    • Back pain    • CTS (carpal tunnel syndrome)    • Depression    • Diabetes mellitus (HCC)    • Difficulty swallowing solids    • Esophageal reflux    • Esophagitis, reflux    • Fatigue    • Fracture of left ankle 2015   • Hypercholesterolemia    • Hyperlipidemia    • Hypertension    • Hypokalemia    • Hyponatremia    • IBS (irritable bowel syndrome)    • Joint pain, knee     both knees   • Osteoarthritis, knee    • Osteoarthritis, shoulder    • Plantar fasciitis    • Sleep apnea        Past Surgical History:   Procedure Laterality Date   • ANKLE SURGERY Left 2017   •  SECTION     • COLONOSCOPY  2015    Non-bleeding internal hemorrhoids   • ENDOSCOPY N/A 2021    Procedure: ESOPHAGOGASTRODUODENOSCOPY with cold bxs;  Surgeon: David Figueredo MD;  Location: [facility];  Service: Gastroenterology;  Laterality: N/A;  PRE: Esophageal Dysphagia  POST: Gastroparesis, Reflux Esophagitis   • HYSTEROSCOPY ENDOMETRIAL ABLATION     •  KNEE ARTHROSCOPY Bilateral    • KNEE SURGERY Right 02/10/2017   • REPLACEMENT TOTAL KNEE Right 01/20/2017   • TONSILLECTOMY     • TOTAL KNEE ARTHROPLASTY Bilateral    • TUBAL ABDOMINAL LIGATION         Scheduled Meds:     Continuous Infusions: No current facility-administered medications for this visit.      PRN Meds:     No Known Allergies    Social History     Socioeconomic History   • Marital status:    • Number of children: 2   • Years of education: 12   • Highest education level: High school graduate   Tobacco Use   • Smoking status: Every Day     Packs/day: 1.50     Types: Cigarettes     Start date: 1/1/1976   • Smokeless tobacco: Never   • Tobacco comments:     Began smoking age 19.  She smoked 1.25 ppd for 11 years, 2.25 ppd for 26 years, 2 ppd for 2 years and 1 ppd for the past 4 years for an 80.25 pack year history.   Substance and Sexual Activity   • Alcohol use: Yes     Comment: social   • Drug use: No   • Sexual activity: Yes     Partners: Male     Birth control/protection: Surgical, Tubal ligation       Family History   Problem Relation Age of Onset   • Arthritis Other    • Cancer Other    • Heart disease Other    • Hyperlipidemia Other    • Stroke Other    • Colon cancer Maternal Grandmother    • Malig Hyperthermia Neg Hx        Review of Systems   Constitutional: Negative for activity change, appetite change, fatigue, fever and unexpected weight change.   HENT: Negative for trouble swallowing.    Respiratory: Negative for apnea, cough, choking, chest tightness, shortness of breath and wheezing.    Cardiovascular: Negative for chest pain, palpitations and leg swelling.   Gastrointestinal: Positive for nausea. Negative for abdominal distention, abdominal pain, anal bleeding, blood in stool, constipation, diarrhea, rectal pain and vomiting.       Vitals:    10/20/22 1423   BP: 146/82   Pulse: 72   Temp: 95.2 °F (35.1 °C)   SpO2: 98%       Physical Exam  Constitutional:       Appearance: She is  well-developed.   Abdominal:      General: Bowel sounds are normal. There is no distension.      Palpations: Abdomen is soft. There is no mass.      Tenderness: There is no abdominal tenderness. There is no guarding.      Hernia: No hernia is present.   Skin:     General: Skin is warm and dry.      Capillary Refill: Capillary refill takes less than 2 seconds.   Neurological:      Mental Status: She is alert and oriented to person, place, and time.   Psychiatric:         Behavior: Behavior normal.     Assessment    Diagnoses and all orders for this visit:    1. Gastroesophageal reflux disease with esophagitis without hemorrhage (Primary)    2. Nausea    3. Gastroparesis  Comments:  Suspected awaiting completion of gastric emptying study    Other orders  -     metoclopramide (REGLAN) 10 MG tablet; Take 1 tablet by mouth 3 (Three) Times a Day.  Dispense: 270 tablet; Refill: 3     Plan    Continue twice daily dosing Prevacid  Continue Reglan encouraged minimization of use in combination gastroparesis type diet  We did discuss the very rare (<1%) risk of irreversible tardive dyskinesia with reglan use and she knows to discontinue the medication and call me should any tremors, tongue rolling, etc, develop.  Follow an antireflux diet  Await GES results scheduled for Monday after Thanksgiving  Colonoscopy for screening purposes 2025         PAPA Fields  [facility]  [address] [place] [address]  Office: [phone]

## 2022-11-09 ENCOUNTER — APPOINTMENT (OUTPATIENT)
Dept: NUCLEAR MEDICINE | Facility: HOSPITAL | Age: 62
End: 2022-11-09

## 2022-11-11 ENCOUNTER — APPOINTMENT (OUTPATIENT)
Dept: CT IMAGING | Facility: HOSPITAL | Age: 62
End: 2022-11-11

## 2022-11-15 ENCOUNTER — APPOINTMENT (OUTPATIENT)
Dept: NUCLEAR MEDICINE | Facility: HOSPITAL | Age: 62
End: 2022-11-15

## 2022-11-28 ENCOUNTER — APPOINTMENT (OUTPATIENT)
Dept: NUCLEAR MEDICINE | Facility: HOSPITAL | Age: 62
End: 2022-11-28

## 2022-12-29 ENCOUNTER — HOSPITAL ENCOUNTER (OUTPATIENT)
Dept: CT IMAGING | Facility: HOSPITAL | Age: 62
Discharge: HOME | End: 2022-12-29
Admitting: CLINICAL NURSE SPECIALIST
Payer: MEDICARE

## 2022-12-29 DIAGNOSIS — F17.210 SMOKING GREATER THAN 30 PACK YEARS: ICD-10-CM

## 2022-12-29 DIAGNOSIS — Z12.2 SCREENING FOR MALIGNANT NEOPLASM OF RESPIRATORY ORGAN: ICD-10-CM

## 2022-12-29 PROCEDURE — 71271 CT THORAX LUNG CANCER SCR C-: CPT

## 2023-01-02 NOTE — PROGRESS NOTES
I have called the patient in follow-up with results - LungRADS Category 2 - benign appearing.  Discussed that no significant change in imaging since last screening in 2017 with the exception of a 4 mm right apex pulmonary nodules considered benign based on size alone.  Advised patient that annual screening is recommended for her and that her next low-dose lung cancer screening CT is due in 12 months.  She states that she is experiencing a lot of stress with a new puppy and has been smoking more than usual and is aware that she needs to cut back.  Encouraged smoking cessation.  Patient verbalizes understanding that her next low-dose lung cancer screening is due in 12 months.

## 2023-04-20 ENCOUNTER — TELEPHONE (OUTPATIENT)
Dept: GASTROENTEROLOGY | Facility: CLINIC | Age: 63
End: 2023-04-20
Payer: MEDICARE

## 2023-04-20 DIAGNOSIS — R10.13 EPIGASTRIC PAIN: ICD-10-CM

## 2023-04-20 DIAGNOSIS — K21.9 GASTROESOPHAGEAL REFLUX DISEASE WITHOUT ESOPHAGITIS: Primary | ICD-10-CM

## 2023-04-20 DIAGNOSIS — R11.0 NAUSEA: ICD-10-CM

## 2023-04-20 DIAGNOSIS — K62.5 RECTAL BLEEDING: ICD-10-CM

## 2023-04-20 NOTE — TELEPHONE ENCOUNTER
Hub staff attempted to follow warm transfer process and was unsuccessful     Caller: Amanda Bettencourt    Relationship to patient: Self    Best call back number: [phone]    Patient is needing: PATIENT HAD BLOOD IN HER STOOL THIS MORNING AFTER SOME CRAMPING AND DIARRHEA. ALTHOUGH BLOOD WAS IN THE MIDDLE OF A LARGE PIECE OF STOOL.     HER SIDE HURTS ON HER WAIST ON THE LEFT HAND SIDE. THIS IS A NEW SYMPTOM.     PLEASE CALL PATIENT TO DISCUSS HER ISSUES AND IF A COLOGUARD TEST MAY BE ADVISED.

## 2023-04-20 NOTE — TELEPHONE ENCOUNTER
"Returned call to pt who stated he symptoms have resolved.  She is no longer experiencing pain.    She stated she saw \"bright red blood\" ab a quarter size with in the stool.  She stated she has a hx of internal hemroids and stated she usually doesn't have bleeding unless she has a lot of diarrhea.    Advised pt if she has increase bleeding or pain she should go to the ER.    "

## 2023-05-17 ENCOUNTER — TELEPHONE (OUTPATIENT)
Dept: GASTROENTEROLOGY | Facility: CLINIC | Age: 63
End: 2023-05-17
Payer: MEDICARE

## 2023-05-17 NOTE — TELEPHONE ENCOUNTER
----- Message from PAPA Fields sent at 5/17/2023 12:50 PM EDT -----  Regarding: FW: Results  Contact: [phone]  Or 10:00 this Friday      ----- Message -----  From: Lacey Hawley MA  Sent: 5/17/2023  12:43 PM EDT  To: PAPA Fields  Subject: FW: Results                                      You still have May 26th at 10:15 am open.   Would that date and time be fine with the patient having bleeding?  ----- Message -----  From: Evi Lacey APRN  Sent: 5/17/2023  12:37 PM EDT  To: Lacey Hawley MA  Subject: FW: Results                                      Do I have any openings left to work her in sooner?      ----- Message -----  From: Kami Sanabria RN  Sent: 5/10/2023   3:34 PM EDT  To: PAPA Fields  Subject: FW: Results                                        ----- Message -----  From: Amanda Bettencourt  Sent: 5/10/2023   2:52 PM EDT  To: [facility]  Subject: Results                                          Yes they have.  I just have flare-ups and with the blood this time it concerned me but everything is ok.  Thanks for getting back with me.

## 2023-05-17 NOTE — TELEPHONE ENCOUNTER
Called and offered work in appointment for patient and patient declined stating that all symptoms have resolved and patient is already scheduled for a visit in July. Patient was advised to call the office if patient needs any further assistance.

## 2023-08-25 ENCOUNTER — TELEPHONE (OUTPATIENT)
Dept: GASTROENTEROLOGY | Facility: CLINIC | Age: 63
End: 2023-08-25
Payer: MEDICARE

## 2023-08-25 DIAGNOSIS — R11.0 NAUSEA: Primary | ICD-10-CM

## 2023-08-25 DIAGNOSIS — K21.9 GASTROESOPHAGEAL REFLUX DISEASE WITHOUT ESOPHAGITIS: ICD-10-CM

## 2023-08-25 DIAGNOSIS — K21.9 GASTRIC REFLUX: ICD-10-CM

## 2023-08-25 NOTE — TELEPHONE ENCOUNTER
Caller: Amanda Bettencourt    Relationship: Self    Best call back number: [phone]    What orders are you requesting (i.e. lab or imaging): NM Gastric Emptying [[id number]]     Where will you receive your lab/imaging services: [facility]     Additional notes: PATIENT STATES THAT SHE HAD TO CANCEL THIS AND WHEN SHE TRIED TO RESCHEDULE, SHE WAS TOLD ORDER HAD . SHE IS WANTING TO KNOW IF [facility] CAN PUT IN ANOTHER ORDER OR IF SHE WILL NEED AN OFFICE VISIT FIRST. PLEASE CALL BACK ASAP TO ADVISE.

## 2023-08-25 NOTE — TELEPHONE ENCOUNTER
Called patient and informed her a new order would be placed for her gastric emptying study. She verbalized understanding

## 2023-09-08 RX ORDER — LANSOPRAZOLE 30 MG/1
CAPSULE, DELAYED RELEASE ORAL
Qty: 180 CAPSULE | Refills: 0 | Status: SHIPPED | OUTPATIENT
Start: 2023-09-08

## 2023-10-25 ENCOUNTER — TELEPHONE (OUTPATIENT)
Dept: GASTROENTEROLOGY | Facility: CLINIC | Age: 63
End: 2023-10-25
Payer: MEDICARE

## 2023-10-25 NOTE — TELEPHONE ENCOUNTER
Called patient and informed her to call us when she needed a refill. Patient verbalized understanding.

## 2023-10-25 NOTE — TELEPHONE ENCOUNTER
I MADE THE PT AN APPT FOR 12/14 AND SHE IS GOING TO NEED TO GET HER   lansoprazole  REFILLED BEFORE HER APPT.  SHE HAS PLENTY NOW THOUGH.  [phone]

## 2023-12-14 ENCOUNTER — OFFICE VISIT (OUTPATIENT)
Dept: GASTROENTEROLOGY | Facility: CLINIC | Age: 63
End: 2023-12-14
Payer: MEDICARE

## 2023-12-14 ENCOUNTER — TRANSCRIBE ORDERS (OUTPATIENT)
Dept: ADMINISTRATIVE | Facility: HOSPITAL | Age: 63
End: 2023-12-14
Payer: MEDICARE

## 2023-12-14 VITALS
WEIGHT: 210 LBS | HEART RATE: 82 BPM | DIASTOLIC BLOOD PRESSURE: 82 MMHG | TEMPERATURE: 97.9 F | BODY MASS INDEX: 33.75 KG/M2 | SYSTOLIC BLOOD PRESSURE: 144 MMHG | HEIGHT: 66 IN | OXYGEN SATURATION: 95 %

## 2023-12-14 DIAGNOSIS — K21.00 GASTROESOPHAGEAL REFLUX DISEASE WITH ESOPHAGITIS WITHOUT HEMORRHAGE: Primary | ICD-10-CM

## 2023-12-14 DIAGNOSIS — R13.19 ESOPHAGEAL DYSPHAGIA: ICD-10-CM

## 2023-12-14 DIAGNOSIS — Z12.2 SCREENING FOR LUNG CANCER: Primary | ICD-10-CM

## 2023-12-14 DIAGNOSIS — K31.84 GASTROPARESIS: ICD-10-CM

## 2023-12-14 RX ORDER — LANSOPRAZOLE 30 MG/1
30 CAPSULE, DELAYED RELEASE ORAL 2 TIMES DAILY
Qty: 180 CAPSULE | Refills: 3 | Status: SHIPPED | OUTPATIENT
Start: 2023-12-14

## 2023-12-14 RX ORDER — BENZONATATE 200 MG/1
200 CAPSULE ORAL 3 TIMES DAILY PRN
COMMUNITY
Start: 2023-11-04

## 2023-12-14 RX ORDER — VILAZODONE HYDROCHLORIDE 20 MG/1
TABLET ORAL SEE ADMIN INSTRUCTIONS
COMMUNITY
Start: 2023-09-30

## 2023-12-14 RX ORDER — FLUTICASONE PROPIONATE AND SALMETEROL 250; 50 UG/1; UG/1
POWDER RESPIRATORY (INHALATION)
COMMUNITY
Start: 2023-11-01

## 2023-12-14 RX ORDER — METOCLOPRAMIDE 10 MG/1
10 TABLET ORAL 3 TIMES DAILY
Qty: 270 TABLET | Refills: 3 | Status: SHIPPED | OUTPATIENT
Start: 2023-12-14

## 2023-12-14 NOTE — PROGRESS NOTES
"Chief Complaint  Heartburn and Med Refill (Lansoprazole /Metoclopramide/)    Subjective          History Of Present Illness:    Amanda Bettencourt is a  63 y.o. female patient of Dr. Figueredo with a history of GERD with esophagitis.  She also has a history of gastroparesis and has been on Reglan.    Patient will occasionally have globus sensation and dysphagia. She is taking prevacid daily.  Will have occasional breakthrough heartburn.  She reports that she is edentulous and does not always put her ensures and when she eats.  She does struggle with swallowing breads occasionally.  She continues to take Reglan daily for her history of gastroparesis.  Reports occasional nausea but no vomiting.  No tremors or signs of tardive dyskinesia.  Reports she will have some diarrhea in the mornings but resolved after that.     Currently doing TMS therapy for her history of depression.     Additional data reviewed:  EGD 5/5/2021 -LA grade B reflux esophagitis, food residue, normal duodenum, irregular Z-line. Esophageal biopsies without any significant inflammation or Pisano's    Objective   Vital Signs:   /82   Pulse 82   Temp 97.9 °F (36.6 °C) (Temporal)   Ht 167.6 cm (66\")   Wt 95.3 kg (210 lb)   SpO2 95%   BMI 33.89 kg/m²       Physical Exam  Vitals reviewed.   Constitutional:       General: She is not in acute distress.     Appearance: Normal appearance. She is not ill-appearing.   HENT:      Head: Normocephalic and atraumatic.      Nose: Nose normal.      Mouth/Throat:      Pharynx: Oropharynx is clear.   Eyes:      Extraocular Movements: Extraocular movements intact.      Conjunctiva/sclera: Conjunctivae normal.      Pupils: Pupils are equal, round, and reactive to light.   Pulmonary:      Effort: Pulmonary effort is normal.   Abdominal:      General: There is no distension.      Palpations: There is no mass.      Tenderness: There is no abdominal tenderness.   Musculoskeletal:         General: No swelling. " Normal range of motion.      Cervical back: Normal range of motion.   Skin:     General: Skin is warm and dry.      Findings: No bruising or rash.   Neurological:      General: No focal deficit present.      Mental Status: She is alert and oriented to person, place, and time.      Motor: No weakness.      Gait: Gait normal.   Psychiatric:         Mood and Affect: Mood normal.          Result Review :   The following data was reviewed by: Maria De Jesus Young PA-C on 12/14/2023:    CBC          5/4/2023    11:20   CBC   WBC 12.3    RBC 4.57    Hemoglobin 13.2    Hematocrit 39.4    MCV 86    MCH 28.9    MCHC 33.5    RDW 12.7    Platelets 355            Assessment and Plan    Diagnoses and all orders for this visit:    1. Gastroesophageal reflux disease with esophagitis without hemorrhage (Primary)  -     lansoprazole (PREVACID) 30 MG capsule; Take 1 capsule by mouth 2 (Two) Times a Day.  Dispense: 180 capsule; Refill: 3  -     FL Esophagram Complete Double-Contrast; Future    2. Esophageal dysphagia  Overview:  Added automatically from request for surgery [id number]    Orders:  -     lansoprazole (PREVACID) 30 MG capsule; Take 1 capsule by mouth 2 (Two) Times a Day.  Dispense: 180 capsule; Refill: 3  -     FL Esophagram Complete Double-Contrast; Future    3. Gastroparesis  -     metoclopramide (REGLAN) 10 MG tablet; Take 1 tablet by mouth 3 (Three) Times a Day.  Dispense: 270 tablet; Refill: 3       Patient's gastric emptying scan is scheduled for January 5.  Continue Reglan as directed  Patient with esophageal dysphagia and history of GERD, proceed with esophagram.  Did advise her that she should wear her dentures every time she eats to prevent food getting stuck. Chew well and eat small bites with plain water.  Continue Prevacid twice daily    Follow Up   Return in about 4 months (around 4/14/2024) for Dr. Figueredo or PAPA Russell.    Dragon dictation used throughout this note.            Maria De Jesus Nance PA-C    [facility]  [facility] [place] [address]  Office: [phone]

## 2024-01-24 ENCOUNTER — TELEPHONE (OUTPATIENT)
Dept: GASTROENTEROLOGY | Facility: CLINIC | Age: 64
End: 2024-01-24
Payer: MEDICARE

## 2024-01-24 NOTE — TELEPHONE ENCOUNTER
Patient called. Advised she cannot eat or drink 4 hours prior to her esophagram. Advised no gum, cigarettes or mints either. She verb understanding.

## 2024-01-24 NOTE — TELEPHONE ENCOUNTER
Hub staff attempted to follow warm transfer process and was unsuccessful     Caller: Amanda Bettencourt    Relationship to patient: Self    Best call back number: [phone]    Patient is needing: HAS ESOPHAGRAM ON MONDAY (01/29/24) @ 8AM. AND IS NEEDING TO KNOW IF THERE ARE ANY PRE APPT INSTRUCTIONS SHE NEEDS TO FOLLOW. SHE ASKED THE PERSON WHO SCHEDULED THE ESOPHAGRAM AND THEY TOLD HER THERE WERE NO SPECIAL INSTRUCTIONS. SHE'S WANTING TO MAKE SURE THIS IS CORRECT, LIKE SHE DOESN'T HAVE A SPECIFIC DIET SHE NEEDS TO FOLLOW OR LIKE NO EATING PRIOR TO APPT, ETC.

## 2024-02-12 ENCOUNTER — TELEPHONE (OUTPATIENT)
Dept: GASTROENTEROLOGY | Facility: CLINIC | Age: 64
End: 2024-02-12
Payer: MEDICARE

## 2024-02-14 ENCOUNTER — HOSPITAL ENCOUNTER (OUTPATIENT)
Facility: HOSPITAL | Age: 64
Discharge: HOME | End: 2024-02-14
Admitting: INTERNAL MEDICINE
Payer: MEDICARE

## 2024-02-14 DIAGNOSIS — Z12.2 SCREENING FOR LUNG CANCER: ICD-10-CM

## 2024-02-14 PROCEDURE — 71271 CT THORAX LUNG CANCER SCR C-: CPT

## 2024-02-15 ENCOUNTER — TRANSCRIBE ORDERS (OUTPATIENT)
Dept: ADMINISTRATIVE | Facility: HOSPITAL | Age: 64
End: 2024-02-15
Payer: MEDICARE

## 2024-02-15 DIAGNOSIS — R07.9 CHEST PAIN, UNSPECIFIED TYPE: Primary | ICD-10-CM

## 2024-06-03 ENCOUNTER — HOSPITAL ENCOUNTER (OUTPATIENT)
Dept: GENERAL RADIOLOGY | Facility: HOSPITAL | Age: 64
Discharge: HOME | End: 2024-06-03
Admitting: PHYSICIAN ASSISTANT
Payer: MEDICARE

## 2024-06-03 DIAGNOSIS — K21.00 GASTROESOPHAGEAL REFLUX DISEASE WITH ESOPHAGITIS WITHOUT HEMORRHAGE: ICD-10-CM

## 2024-06-03 DIAGNOSIS — R13.19 ESOPHAGEAL DYSPHAGIA: ICD-10-CM

## 2024-06-03 PROCEDURE — 74221 X-RAY XM ESOPHAGUS 2CNTRST: CPT

## 2024-06-03 RX ADMIN — ANTACID/ANTIFLATULENT 1 PACKET: 380; 550; 10; 10 GRANULE, EFFERVESCENT ORAL at 09:08

## 2024-06-03 RX ADMIN — BARIUM SULFATE 183 ML: 960 POWDER, FOR SUSPENSION ORAL at 09:08

## 2024-06-03 RX ADMIN — BARIUM SULFATE 700 MG: 700 TABLET ORAL at 09:08

## 2024-06-03 RX ADMIN — BARIUM SULFATE 135 ML: 980 POWDER, FOR SUSPENSION ORAL at 09:08

## 2024-06-03 NOTE — PROGRESS NOTES
Please let the patient know her esophagram does not show any abnormalities to explain her trouble swallowing.  If she is continues to have issues, I would likely need to do an updated upper endoscopy to rule out any other possible causes.  She does have a very small area called a diverticulum which is a small outpouching of the esophagus.  Generally these do not cause issues unless they are larger than 1 cm which hers is not.

## 2024-07-09 ENCOUNTER — TRANSCRIBE ORDERS (OUTPATIENT)
Dept: ADMINISTRATIVE | Facility: HOSPITAL | Age: 64
End: 2024-07-09
Payer: MEDICARE

## 2024-07-09 DIAGNOSIS — Z12.31 BREAST CANCER SCREENING BY MAMMOGRAM: Primary | ICD-10-CM

## 2024-07-31 ENCOUNTER — TRANSCRIBE ORDERS (OUTPATIENT)
Dept: ADMINISTRATIVE | Facility: HOSPITAL | Age: 64
End: 2024-07-31
Payer: MEDICARE

## 2024-07-31 DIAGNOSIS — Z12.31 VISIT FOR SCREENING MAMMOGRAM: ICD-10-CM

## 2024-07-31 DIAGNOSIS — Z78.0 POSTMENOPAUSAL: Primary | ICD-10-CM

## 2024-09-23 ENCOUNTER — TELEPHONE (OUTPATIENT)
Dept: GASTROENTEROLOGY | Facility: CLINIC | Age: 64
End: 2024-09-23
Payer: MEDICARE

## 2024-09-23 NOTE — TELEPHONE ENCOUNTER
Caller: Amanda Bettencourt    Relationship: Self    Best call back number: [phone]    What is the best time to reach you: ANYTIME    Who are you requesting to speak with (clinical staff, provider,  specific staff member): CLINICAL STAFF       What was the call regarding: PT WAS HOSPITALIZED FOR A WEEK FOR A STROKE. PT HAD SOME MEDICATION CHANGED PT WAS PREVIOUSLY ON PREVACID 2 TIMES A DAY ON NOW SHE TAKING PROTONIX 1 TIME A DAY. PT IS WANTING TO SPEAK WITH [facility] ABOUT THIS MEDICATION CHANGE. PLEASE CALL AND ADVISE

## 2024-10-01 NOTE — TELEPHONE ENCOUNTER
Caller: Amanda Bettencourt    Relationship to patient: Self    Best call back number: [phone]    Patient is needing: PT IS CALLING AGAIN REGARDING THIS. PLEASE CALL PT TO ADVISE

## 2024-10-02 ENCOUNTER — TELEPHONE (OUTPATIENT)
Dept: GASTROENTEROLOGY | Facility: CLINIC | Age: 64
End: 2024-10-02

## 2024-10-02 RX ORDER — PANTOPRAZOLE SODIUM 40 MG/1
40 TABLET, DELAYED RELEASE ORAL 2 TIMES DAILY
Qty: 180 TABLET | Refills: 3 | Status: SHIPPED | OUTPATIENT
Start: 2024-10-02

## 2024-10-02 NOTE — TELEPHONE ENCOUNTER
Returned phone call to patient. She states she has had a stroke and is taking several different medications. She reports taking Pantoprazole 40 mg daily does not help. Taking it twice a day does. She has been taking it twice a day and is almost out of medication.   Advised will send Evi an update. She verb understanding.

## 2024-10-02 NOTE — TELEPHONE ENCOUNTER
Hub staff attempted to follow warm transfer process and was unsuccessful     Caller: Amanda Bettencourt    Relationship to patient: Self    Best call back number: [phone]    Patient is needing: RETURNING MISSED CALL. BEST TIME TO REACH PT IS ANYTIME

## 2024-12-09 ENCOUNTER — TELEPHONE (OUTPATIENT)
Dept: GASTROENTEROLOGY | Facility: CLINIC | Age: 64
End: 2024-12-09
Payer: MEDICARE

## 2024-12-09 NOTE — TELEPHONE ENCOUNTER
Hub staff attempted to follow warm transfer process and was unsuccessful     Caller: Amanda Bettencourt    Relationship to patient: Self    Best call back number: [phone]    Patient is needing: WAS ON PREVICID BUT IT WAS CHANGED AND NOW SHE IS HAVING ISSUES.  WANTED TO KNOW IF THERE IS A REASON SHE CANNOT TAKE PREVACID NOW.  PLEASE ADVISE.

## 2024-12-09 NOTE — TELEPHONE ENCOUNTER
Patient called. She states she the Pantoprazole is not working. She states the stinky burps have returned. She states she would like to go back on Lansoprazole daily. Update to Evi.

## 2025-01-29 ENCOUNTER — TRANSCRIBE ORDERS (OUTPATIENT)
Dept: ADMINISTRATIVE | Facility: HOSPITAL | Age: 65
End: 2025-01-29
Payer: MEDICARE

## 2025-01-29 DIAGNOSIS — Z12.2 SCREENING FOR MALIGNANT NEOPLASM OF RESPIRATORY ORGAN: Primary | ICD-10-CM

## 2025-02-03 ENCOUNTER — TRANSCRIBE ORDERS (OUTPATIENT)
Dept: ADMINISTRATIVE | Facility: HOSPITAL | Age: 65
End: 2025-02-03
Payer: MEDICARE

## 2025-02-03 DIAGNOSIS — Z12.2 SCREENING FOR LUNG CANCER: Primary | ICD-10-CM

## 2025-02-11 ENCOUNTER — TELEPHONE (OUTPATIENT)
Dept: GASTROENTEROLOGY | Facility: CLINIC | Age: 65
End: 2025-02-11
Payer: MEDICARE

## 2025-02-11 NOTE — TELEPHONE ENCOUNTER
Called pt, she started having GERD sx this past weekend and now pain is radiating down towards navel. Pt states she is taking Pantoprazole BID.  She has taken Lansoprazole in the past but it did not work to control sx.  Pt states pain is worse with eating. Reports nausea but no vomitng. Bowel are moving normally and pt states they are dark brown, not black and no blood. Denies fever.     Advised will send an update to PADMAJA Steve.

## 2025-02-11 NOTE — TELEPHONE ENCOUNTER
[facility] staff attempted to follow warm transfer process and was unsuccessful     Caller: Amanda Bettencourt    Relationship to patient: Self    Best call back number: [phone]    Patient is needing: PT IS CALLING TO REPORT THAT ON SATURDAY AFTERNOON SHE STARTED WITH A PAIN THAT FELT LIKE HEARTBURN BUT NOW FEELS PAIN AND TENDERNESS ABOVE BELLY BUTTON. PT HAS TAKEN PROTONIX BUT DOESN'T FEEL LIKE IT HAS HELPED. [facility] ATTEMPTED TO SCHEDULE APPT WITH [facility] BUT PT DID NOT WANT TO WAIT UNTIL APRIL. PLEASE CALL PT BACK AND ADVISE

## 2025-02-11 NOTE — TELEPHONE ENCOUNTER
Called pt and advised of Evi GIANG's note.  Pt verbalized understanding.    F/u appt made 4/11 @9:15am.

## 2025-02-11 NOTE — TELEPHONE ENCOUNTER
Looks like we have not seen her since 2023.  I would recommend she come in for an office visit.  Along with pantoprazole twice daily she could start FD guard which she can purchase over-the-counter and would recommend strict antireflux dietary precautions.  She could certainly see her primary care provider in the meanwhile.

## 2025-04-13 DIAGNOSIS — K31.84 GASTROPARESIS: ICD-10-CM

## 2025-04-14 RX ORDER — METOCLOPRAMIDE 10 MG/1
10 TABLET ORAL 3 TIMES DAILY
Qty: 270 TABLET | Refills: 3 | Status: SHIPPED | OUTPATIENT
Start: 2025-04-14

## 2025-05-07 ENCOUNTER — TRANSCRIBE ORDERS (OUTPATIENT)
Dept: ADMINISTRATIVE | Facility: HOSPITAL | Age: 65
End: 2025-05-07
Payer: MEDICARE

## 2025-05-07 DIAGNOSIS — Z78.0 POSTMENOPAUSAL: Primary | ICD-10-CM

## 2025-05-07 DIAGNOSIS — Z12.31 VISIT FOR SCREENING MAMMOGRAM: ICD-10-CM

## 2025-05-07 DIAGNOSIS — Z12.2 SCREENING FOR LUNG CANCER: ICD-10-CM

## 2025-05-14 ENCOUNTER — TELEPHONE (OUTPATIENT)
Dept: GASTROENTEROLOGY | Facility: CLINIC | Age: 65
End: 2025-05-14
Payer: MEDICARE

## 2025-05-14 NOTE — TELEPHONE ENCOUNTER
Caller: Amanda Bettencourt    Relationship: Self    Best call back number: [phone]    Which medication are you concerned about:   metoclopramide (REGLAN) 10 MG tablet  AND NEW MED/WELLBUTRIN    Who prescribed you this medication: REGLAN/DR PENA.   WELLBUTRIN/  NP AT [facility]    When did you start taking this medication: WELLBUTRIN/SINCE DEC.  REGLAN/YEARS    What are your concerns: PT STATES SHE HAS FALLEN DOWN 3 TIMES RECENTLY PT IS CONCERNED WOULD LIKE SOME FEEDBACK TO ADDRESS POSSIBLE REASONS CONCERNING HER FALLS. PLEASE CONTACT PT TO ADDRESS THESE CONCERNS.     How long have you had these concerns: A FEW WEEKS

## 2025-06-05 ENCOUNTER — HOSPITAL ENCOUNTER (OUTPATIENT)
Dept: CARDIOLOGY | Facility: HOSPITAL | Age: 65
Discharge: HOME | End: 2025-06-05
Admitting: INTERNAL MEDICINE
Payer: MEDICARE

## 2025-06-05 ENCOUNTER — TRANSCRIBE ORDERS (OUTPATIENT)
Dept: ADMINISTRATIVE | Facility: HOSPITAL | Age: 65
End: 2025-06-05
Payer: MEDICARE

## 2025-06-05 DIAGNOSIS — R60.0 LEG EDEMA: Primary | ICD-10-CM

## 2025-06-05 DIAGNOSIS — R60.0 LEG EDEMA: ICD-10-CM

## 2025-06-05 LAB
BH CV LOWER VASCULAR LEFT COMMON FEMORAL AUGMENT: NORMAL
BH CV LOWER VASCULAR LEFT COMMON FEMORAL COMPETENT: NORMAL
BH CV LOWER VASCULAR LEFT COMMON FEMORAL COMPRESS: NORMAL
BH CV LOWER VASCULAR LEFT COMMON FEMORAL PHASIC: NORMAL
BH CV LOWER VASCULAR LEFT COMMON FEMORAL SPONT: NORMAL
BH CV LOWER VASCULAR RIGHT COMMON FEMORAL AUGMENT: NORMAL
BH CV LOWER VASCULAR RIGHT COMMON FEMORAL COMPETENT: NORMAL
BH CV LOWER VASCULAR RIGHT COMMON FEMORAL COMPRESS: NORMAL
BH CV LOWER VASCULAR RIGHT COMMON FEMORAL PHASIC: NORMAL
BH CV LOWER VASCULAR RIGHT COMMON FEMORAL SPONT: NORMAL
BH CV LOWER VASCULAR RIGHT DISTAL FEMORAL COMPRESS: NORMAL
BH CV LOWER VASCULAR RIGHT GASTRONEMIUS COMPRESS: NORMAL
BH CV LOWER VASCULAR RIGHT GREATER SAPH AK COMPRESS: NORMAL
BH CV LOWER VASCULAR RIGHT GREATER SAPH BK COMPRESS: NORMAL
BH CV LOWER VASCULAR RIGHT LESSER SAPH COMPRESS: NORMAL
BH CV LOWER VASCULAR RIGHT MID FEMORAL AUGMENT: NORMAL
BH CV LOWER VASCULAR RIGHT MID FEMORAL COMPETENT: NORMAL
BH CV LOWER VASCULAR RIGHT MID FEMORAL COMPRESS: NORMAL
BH CV LOWER VASCULAR RIGHT MID FEMORAL PHASIC: NORMAL
BH CV LOWER VASCULAR RIGHT MID FEMORAL SPONT: NORMAL
BH CV LOWER VASCULAR RIGHT PERONEAL COMPRESS: NORMAL
BH CV LOWER VASCULAR RIGHT POPLITEAL AUGMENT: NORMAL
BH CV LOWER VASCULAR RIGHT POPLITEAL COMPETENT: NORMAL
BH CV LOWER VASCULAR RIGHT POPLITEAL COMPRESS: NORMAL
BH CV LOWER VASCULAR RIGHT POPLITEAL PHASIC: NORMAL
BH CV LOWER VASCULAR RIGHT POPLITEAL SPONT: NORMAL
BH CV LOWER VASCULAR RIGHT POSTERIOR TIBIAL COMPRESS: NORMAL
BH CV LOWER VASCULAR RIGHT PROFUNDA FEMORAL COMPRESS: NORMAL
BH CV LOWER VASCULAR RIGHT PROXIMAL FEMORAL COMPRESS: NORMAL
BH CV LOWER VASCULAR RIGHT SAPHENOFEMORAL JUNCTION COMPRESS: NORMAL

## 2025-06-05 PROCEDURE — 93971 EXTREMITY STUDY: CPT

## 2025-07-31 ENCOUNTER — HOSPITAL ENCOUNTER (OUTPATIENT)
Dept: BONE DENSITY | Facility: HOSPITAL | Age: 65
Discharge: HOME | End: 2025-07-31
Payer: MEDICARE

## 2025-07-31 ENCOUNTER — HOSPITAL ENCOUNTER (OUTPATIENT)
Dept: MAMMOGRAPHY | Facility: HOSPITAL | Age: 65
Discharge: HOME | End: 2025-07-31
Payer: MEDICARE

## 2025-07-31 DIAGNOSIS — Z78.0 POSTMENOPAUSAL: ICD-10-CM

## 2025-07-31 DIAGNOSIS — Z12.31 VISIT FOR SCREENING MAMMOGRAM: ICD-10-CM

## 2025-07-31 PROCEDURE — 77067 SCR MAMMO BI INCL CAD: CPT

## 2025-07-31 PROCEDURE — 77080 DXA BONE DENSITY AXIAL: CPT

## 2025-07-31 PROCEDURE — 77063 BREAST TOMOSYNTHESIS BI: CPT

## (undated) DEVICE — LN SMPL CO2 SHTRM SD STREAM W/M LUER

## (undated) DEVICE — KT ORCA ORCAPOD DISP STRL

## (undated) DEVICE — SENSR O2 OXIMAX FNGR A/ 18IN NONSTR

## (undated) DEVICE — BITEBLOCK OMNI BLOC

## (undated) DEVICE — FRCP BX RADJAW4 NDL 2.8 240CM LG OG BX40

## (undated) DEVICE — TUBING, SUCTION, 1/4" X 10', STRAIGHT: Brand: MEDLINE

## (undated) DEVICE — CANN O2 ETCO2 FITS ALL CONN CO2 SMPL A/ 7IN DISP LF

## (undated) DEVICE — ADAPT CLN BIOGUARD AIR/H2O DISP